# Patient Record
Sex: MALE | Race: WHITE | Employment: FULL TIME | URBAN - METROPOLITAN AREA
[De-identification: names, ages, dates, MRNs, and addresses within clinical notes are randomized per-mention and may not be internally consistent; named-entity substitution may affect disease eponyms.]

---

## 2017-04-13 ENCOUNTER — OFFICE VISIT (OUTPATIENT)
Dept: FAMILY MEDICINE | Facility: CLINIC | Age: 57
End: 2017-04-13
Payer: COMMERCIAL

## 2017-04-13 VITALS
OXYGEN SATURATION: 98 % | DIASTOLIC BLOOD PRESSURE: 89 MMHG | BODY MASS INDEX: 28.86 KG/M2 | SYSTOLIC BLOOD PRESSURE: 136 MMHG | WEIGHT: 217.8 LBS | HEIGHT: 73 IN | TEMPERATURE: 97.2 F | HEART RATE: 70 BPM

## 2017-04-13 DIAGNOSIS — R10.32 ABDOMINAL PAIN, LEFT LOWER QUADRANT: Primary | ICD-10-CM

## 2017-04-13 PROCEDURE — 99213 OFFICE O/P EST LOW 20 MIN: CPT | Performed by: FAMILY MEDICINE

## 2017-04-13 NOTE — NURSING NOTE
"Chief Complaint   Patient presents with     Abdominal Pain       Initial BP (!) 148/94  Pulse 70  Temp 97.2  F (36.2  C) (Tympanic)  Ht 6' 1\" (1.854 m)  Wt 217 lb 12.8 oz (98.8 kg)  SpO2 98%  BMI 28.74 kg/m2 Estimated body mass index is 28.74 kg/(m^2) as calculated from the following:    Height as of this encounter: 6' 1\" (1.854 m).    Weight as of this encounter: 217 lb 12.8 oz (98.8 kg).  Medication Reconciliation: complete     Sandee Vergara MA      "

## 2017-04-13 NOTE — PROGRESS NOTES
SUBJECTIVE:                                                    Rex Jensen is a 56 year old male who presents to clinic today for the following health issues:      ABDOMINAL PAIN     Onset: x3-4 days     Description:   Character: Burning, aching, pain with coughing   Location: Lower left abdomin/ pelvic region   Radiation: None    Intensity: 3/10    Progression of Symptoms:  worsening    Accompanying Signs & Symptoms:  Fever/Chills?: no   Gas/Bloating: YES  Nausea: no   Vomitting: no   Diarrhea?: no   Constipation: YES   Dysuria or Hematuria: no    History:   Trauma: no, pt thinks it may be a hernia.  Noticing a something protruding in lower left abdomen   Previous similar pain: no    Previous tests done: none    Precipitating factors:   Does the pain change with:     Food: YES- pain is worse after eating      BM: YES- relief after BM     Urination: no     Alleviating factors:  none    Therapies Tried and outcome: none    LMP:  not applicable       Denies having any fever or chills.  States that he discontinued taking his Protonix due to fear of potential long term effects.   Last BM was this morning, small one.     Problem list and histories reviewed & adjusted, as indicated.  Additional history: as documented    Current Outpatient Prescriptions   Medication Sig Dispense Refill     albuterol (PROAIR HFA/PROVENTIL HFA/VENTOLIN HFA) 108 (90 BASE) MCG/ACT Inhaler Inhale 2 puffs into the lungs every 6 hours as needed for shortness of breath / dyspnea or wheezing 1 Inhaler 0     cetirizine (ZYRTEC) 10 MG tablet Take 1 tablet (10 mg) by mouth every evening 90 tablet 3     pantoprazole (PROTONIX) 40 MG enteric coated tablet Take 1 tablet (40 mg) by mouth daily 90 tablet 3     fexofenadine (ALLEGRA) 180 MG tablet Take 1 tablet (180 mg) by mouth daily 30 tablet 2     ASPIRIN 325 MG OR TBEC ONE DAILY 100 3     amitriptyline (ELAVIL) 10 MG tablet Take 1-2 tablets by mouth at bedtime as needed. (Patient not taking:  "Reported on 4/13/2017) 60 tablet 2     indomethacin (INDOCIN) 25 MG capsule Take 1 capsule by mouth at the onset of a headache. (Patient not taking: Reported on 4/13/2017) 30 capsule 1     Allergies   Allergen Reactions     No Known Allergies        Reviewed and updated as needed this visit by clinical staff  Tobacco  Allergies  Meds       Reviewed and updated as needed this visit by Provider         ROS:  Constitutional, HEENT, cardiovascular, pulmonary, gi and gu systems are negative, except as otherwise noted.    OBJECTIVE:                                                    BP (!) 148/94  Pulse 70  Temp 97.2  F (36.2  C) (Tympanic)  Ht 6' 1\" (1.854 m)  Wt 217 lb 12.8 oz (98.8 kg)  SpO2 98%  BMI 28.74 kg/m2  Body mass index is 28.74 kg/(m^2).  GENERAL: healthy, alert and no distress  RESP: lungs clear to auscultation - no rales, rhonchi or wheezes  CV: regular rate and rhythm, normal S1 S2, no S3 or S4, no murmur, click or rub, no peripheral edema and peripheral pulses strong  ABDOMEN: soft, mild LLQ tenderness, no obvious abdominal hernia. Np hepatosplenomegaly, no masses and bowel sounds normal    Diagnostic Test Results:  See orders below     ASSESSMENT/PLAN:                                                    Rex was seen today for abdominal pain.    Diagnoses and all orders for this visit:    Abdominal pain, left lower quadrant  -     CT Abdomen Pelvis w Contrast; Future  -     H Pylori antigen stool; Future          -    Resume taking PPI, Protonix for 2-4 weeks.         -     May take OTC stool softener as needed for constipation.    Will notify patient with results    If symptoms worsened, patient instructed to go to the nearest ER for further evaluation. Patient verbalized understanding.        Sonia Bentley MD  [facility]    "

## 2017-04-13 NOTE — MR AVS SNAPSHOT
After Visit Summary   4/13/2017    Rex Jensen    MRN: [id number]           Patient Information     Date Of Birth          1960        Visit Information        Provider Department      4/13/2017 10:00 AM Sonia Bentley MD [facility]        Today's Diagnoses     Abdominal pain, left lower quadrant    -  1      Care Instructions    Resume taking Protonix.  Will notify you with results        Follow-ups after your visit        Follow-up notes from your care team     Return if symptoms worsen or fail to improve.      Future tests that were ordered for you today     Open Future Orders        Priority Expected Expires Ordered    CT Abdomen Pelvis w Contrast Routine  4/13/2018 4/13/2017    H Pylori antigen stool Routine  5/13/2017 4/13/2017            Who to contact     Normal or non-critical lab and imaging results will be communicated to you by StackBlazehart, letter or phone within 4 business days after the clinic has received the results. If you do not hear from us within 7 days, please contact the clinic through [employer] or phone. If you have a critical or abnormal lab result, we will notify you by phone as soon as possible.  Submit refill requests through [employer] or call your pharmacy and they will forward the refill request to us. Please allow 3 business days for your refill to be completed.          If you need to speak with a  for additional information , please call: [phone]             Additional Information About Your Visit        StackBlazeharNetIQ Information     [employer] gives you secure access to your electronic health record. If you see a primary care provider, you can also send messages to your care team and make appointments. If you have questions, please call your primary care clinic.  If you do not have a primary care provider, please call [phone] and they will assist you.        Care EveryWhere ID     This is your Care EveryWhere ID. This could be used by  "other organizations to access your [place] medical records  [id number]        Your Vitals Were     Pulse Temperature Height Pulse Oximetry BMI (Body Mass Index)       70 97.2  F (36.2  C) (Tympanic) 6' 1\" (1.854 m) 98% 28.74 kg/m2        Blood Pressure from Last 3 Encounters:   04/13/17 (!) 148/94   12/01/16 125/84   11/21/16 126/90    Weight from Last 3 Encounters:   04/13/17 217 lb 12.8 oz (98.8 kg)   12/01/16 223 lb 6.4 oz (101.3 kg)   11/21/16 219 lb (99.3 kg)               Primary Care Provider Office Phone #    Marion Mullins [facility] [phone]       No address on file        Thank you!     Thank you for choosing [facility]  for your care. Our goal is always to provide you with excellent care. Hearing back from our patients is one way we can continue to improve our services. Please take a few minutes to complete the written survey that you may receive in the mail after your visit with us. Thank you!             Your Updated Medication List - Protect others around you: Learn how to safely use, store and throw away your medicines at www.disposemymeds.org.          This list is accurate as of: 4/13/17 10:33 AM.  Always use your most recent med list.                   Brand Name Dispense Instructions for use    albuterol 108 (90 BASE) MCG/ACT Inhaler    PROAIR HFA/PROVENTIL HFA/VENTOLIN HFA    1 Inhaler    Inhale 2 puffs into the lungs every 6 hours as needed for shortness of breath / dyspnea or wheezing       amitriptyline 10 MG tablet    ELAVIL    60 tablet    Take 1-2 tablets by mouth at bedtime as needed.       aspirin 325 MG EC tablet     100    ONE DAILY       cetirizine 10 MG tablet    zyrTEC    90 tablet    Take 1 tablet (10 mg) by mouth every evening       fexofenadine 180 MG tablet    ALLEGRA    30 tablet    Take 1 tablet (180 mg) by mouth daily       indomethacin 25 MG capsule    INDOCIN    30 capsule    Take 1 capsule by mouth at the onset of a headache.       pantoprazole 40 MG EC " tablet    PROTONIX    90 tablet    Take 1 tablet (40 mg) by mouth daily

## 2017-04-14 ENCOUNTER — RADIANT APPOINTMENT (OUTPATIENT)
Dept: CT IMAGING | Facility: CLINIC | Age: 57
End: 2017-04-14
Attending: FAMILY MEDICINE
Payer: COMMERCIAL

## 2017-04-14 DIAGNOSIS — R10.32 ABDOMINAL PAIN, LEFT LOWER QUADRANT: ICD-10-CM

## 2017-04-14 LAB — RADIOLOGIST FLAGS: ABNORMAL

## 2017-04-14 PROCEDURE — 74177 CT ABD & PELVIS W/CONTRAST: CPT | Mod: TC

## 2017-04-14 RX ORDER — IOPAMIDOL 755 MG/ML
100 INJECTION, SOLUTION INTRAVASCULAR ONCE
Status: COMPLETED | OUTPATIENT
Start: 2017-04-14 | End: 2017-04-14

## 2017-04-14 RX ADMIN — IOPAMIDOL 100 ML: 755 INJECTION, SOLUTION INTRAVASCULAR at 13:26

## 2017-04-15 ENCOUNTER — TELEPHONE (OUTPATIENT)
Dept: FAMILY MEDICINE | Facility: CLINIC | Age: 57
End: 2017-04-15

## 2017-04-15 NOTE — PROGRESS NOTES
Received request for CT results.  Chart reviewed and provider attempted to call.  Talked with patient and relayed results of 11 cm mass.    Recommended called to see surgery next week. Go to ED if worsening pain. Patient stated he would go to [facility] if needed.    Brittnee Bagley MD

## 2017-04-17 ENCOUNTER — OFFICE VISIT (OUTPATIENT)
Dept: FAMILY MEDICINE | Facility: CLINIC | Age: 57
End: 2017-04-17
Payer: COMMERCIAL

## 2017-04-17 ENCOUNTER — TELEPHONE (OUTPATIENT)
Dept: FAMILY MEDICINE | Facility: CLINIC | Age: 57
End: 2017-04-17

## 2017-04-17 VITALS
WEIGHT: 217 LBS | BODY MASS INDEX: 28.76 KG/M2 | HEIGHT: 73 IN | DIASTOLIC BLOOD PRESSURE: 85 MMHG | SYSTOLIC BLOOD PRESSURE: 122 MMHG | HEART RATE: 68 BPM | RESPIRATION RATE: 18 BRPM | OXYGEN SATURATION: 98 %

## 2017-04-17 DIAGNOSIS — R10.32 ABDOMINAL PAIN, LEFT LOWER QUADRANT: ICD-10-CM

## 2017-04-17 DIAGNOSIS — K66.8 MASS OF PERITONEUM: Primary | ICD-10-CM

## 2017-04-17 DIAGNOSIS — R19.00 RETROPERITONEAL MASS: Primary | ICD-10-CM

## 2017-04-17 DIAGNOSIS — E55.9 VITAMIN D DEFICIENCY: ICD-10-CM

## 2017-04-17 PROCEDURE — 99214 OFFICE O/P EST MOD 30 MIN: CPT | Performed by: PHYSICIAN ASSISTANT

## 2017-04-17 PROCEDURE — 82306 VITAMIN D 25 HYDROXY: CPT | Performed by: PHYSICIAN ASSISTANT

## 2017-04-17 PROCEDURE — 36415 COLL VENOUS BLD VENIPUNCTURE: CPT | Performed by: PHYSICIAN ASSISTANT

## 2017-04-17 RX ORDER — HYDROCODONE BITARTRATE AND ACETAMINOPHEN 5; 325 MG/1; MG/1
1-2 TABLET ORAL EVERY 6 HOURS PRN
Qty: 20 TABLET | Refills: 0 | Status: SHIPPED | OUTPATIENT
Start: 2017-04-17 | End: 2017-04-21

## 2017-04-17 RX ORDER — AMOXICILLIN 250 MG
1 CAPSULE ORAL DAILY
Qty: 100 TABLET | Refills: 0 | Status: SHIPPED | OUTPATIENT
Start: 2017-04-17 | End: 2017-05-05

## 2017-04-17 NOTE — TELEPHONE ENCOUNTER
Patient states he has a mass and would like to know where he should go from here.  Please call.    Thank you.

## 2017-04-17 NOTE — PROGRESS NOTES
SUBJECTIVE:                                                    Rex Jensen is a 56 year old male who presents to clinic today for the following health issues:      Mass-discuss CT results. Second opinion.  PET scan ordered. Oncology surgery referral placed.     Rare leg leg and periscrotal paresthesias.  Occasional constipation and postprandial pain. No n/v, noc sweats or weight changes.      Problem list and histories reviewed & adjusted, as indicated.  Additional history: as documented        Reviewed and updated as needed this visit by clinical staff  Tobacco  Allergies  Meds  Med Hx  Surg Hx  Fam Hx  Soc Hx      Reviewed and updated as needed this visit by Provider         All other systems negative except as outline above  OBJECTIVE:  Eye exam - right eye normal lid, conjunctiva, cornea, pupil and fundus, left eye normal lid, conjunctiva, cornea, pupil and fundus.  Thyroid not palpable, not enlarged, no nodules detected.  CHEST:chest clear to IPPA, no tachypnea, retractions or cyanosis and S1, S2 normal, no murmur, no gallop, rate regular.  The abdomen is soft without tenderness, guarding, mass, rebound or organomegaly. Bowel sounds are normal. No CVA tenderness or inguinal adenopathy noted.  No edema  Rex was seen today for mass.    Diagnoses and all orders for this visit:    Retroperitoneal mass  -     Surgical Oncology Adult IP Consult    Vitamin D deficiency  -     Vitamin D Deficiency      Pet scan pending.

## 2017-04-17 NOTE — TELEPHONE ENCOUNTER
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 Follow-up with surgical oncology as needed for further  assessment. PET/CT exam as needed for staging.

## 2017-04-17 NOTE — TELEPHONE ENCOUNTER
Patient states he would like to be fit in to see you today about a 2nd opinion on a mass that a scan showed.  Please call.    Thank you.

## 2017-04-17 NOTE — MR AVS SNAPSHOT
After Visit Summary   4/17/2017    eRx Jensen    MRN: [id number]           Patient Information     Date Of Birth          1960        Visit Information        Provider Department      4/17/2017 1:40 PM Js Magdaleno PA-C [facility]        Today's Diagnoses     Retroperitoneal mass    -  1    Vitamin D deficiency           Follow-ups after your visit        Additional Services     Surgical Oncology Adult IP Consult                 Future tests that were ordered for you today     Open Future Orders        Priority Expected Expires Ordered    PET Oncology Whole Body Routine  4/17/2018 4/17/2017            Who to contact     Normal or non-critical lab and imaging results will be communicated to you by InitMehart, letter or phone within 4 business days after the clinic has received the results. If you do not hear from us within 7 days, please contact the clinic through [employer] or phone. If you have a critical or abnormal lab result, we will notify you by phone as soon as possible.  Submit refill requests through [employer] or call your pharmacy and they will forward the refill request to us. Please allow 3 business days for your refill to be completed.          If you need to speak with a  for additional information , please call: [phone]             Additional Information About Your Visit        InitMeharRocketBux Information     [employer] gives you secure access to your electronic health record. If you see a primary care provider, you can also send messages to your care team and make appointments. If you have questions, please call your primary care clinic.  If you do not have a primary care provider, please call [phone] and they will assist you.        Care EveryWhere ID     This is your Care EveryWhere ID. This could be used by other organizations to access your [place] medical records  [id number]        Your Vitals Were     Pulse Respirations Height Pulse Oximetry  "BMI (Body Mass Index)       68 18 6' 1\" (1.854 m) 98% 28.63 kg/m2        Blood Pressure from Last 3 Encounters:   04/17/17 122/85   04/13/17 136/89   12/01/16 125/84    Weight from Last 3 Encounters:   04/17/17 217 lb (98.4 kg)   04/13/17 217 lb 12.8 oz (98.8 kg)   12/01/16 223 lb 6.4 oz (101.3 kg)              We Performed the Following     Surgical Oncology Adult IP Consult     Vitamin D Deficiency          Today's Medication Changes          These changes are accurate as of: 4/17/17  2:00 PM.  If you have any questions, ask your nurse or doctor.               Start taking these medicines.        Dose/Directions    HYDROcodone-acetaminophen 5-325 MG per tablet   Commonly known as:  NORCO   Used for:  Abdominal pain, left lower quadrant   Started by:  Sonia Bentley MD        Dose:  1-2 tablet   Take 1-2 tablets by mouth every 6 hours as needed for moderate to severe pain maximum 2 tablet(s) per day   Quantity:  20 tablet   Refills:  0       senna-docusate 8.6-50 MG per tablet   Commonly known as:  SENOKOT-S;PERICOLACE   Used for:  Abdominal pain, left lower quadrant   Started by:  Sonia Bentley MD        Dose:  1 tablet   Take 1 tablet by mouth daily   Quantity:  100 tablet   Refills:  0         Stop taking these medicines if you haven't already. Please contact your care team if you have questions.     albuterol 108 (90 BASE) MCG/ACT Inhaler   Commonly known as:  PROAIR HFA/PROVENTIL HFA/VENTOLIN HFA   Stopped by:  Js Magdaleno PA-C           indomethacin 25 MG capsule   Commonly known as:  INDOCIN   Stopped by:  Js Magdaleno PA-C                Where to get your medicines      These medications were sent to [facility] [id number]  [place] [address] [address] [address] [address] [address] [address]     Phone:  [phone]     senna-docusate 8.6-50 MG per tablet         Some of these will need a paper prescription and others can be bought over the " counter.  Ask your nurse if you have questions.     Bring a paper prescription for each of these medications     HYDROcodone-acetaminophen 5-325 MG per tablet                Primary Care Provider Office Phone #    Lior Frank [phone]       No address on file        Thank you!     Thank you for choosing [facility]  for your care. Our goal is always to provide you with excellent care. Hearing back from our patients is one way we can continue to improve our services. Please take a few minutes to complete the written survey that you may receive in the mail after your visit with us. Thank you!             Your Updated Medication List - Protect others around you: Learn how to safely use, store and throw away your medicines at www.disposemymeds.org.          This list is accurate as of: 4/17/17  2:00 PM.  Always use your most recent med list.                   Brand Name Dispense Instructions for use    amitriptyline 10 MG tablet    ELAVIL    60 tablet    Take 1-2 tablets by mouth at bedtime as needed.       aspirin 325 MG EC tablet     100    ONE DAILY       cetirizine 10 MG tablet    zyrTEC    90 tablet    Take 1 tablet (10 mg) by mouth every evening       fexofenadine 180 MG tablet    ALLEGRA    30 tablet    Take 1 tablet (180 mg) by mouth daily       HYDROcodone-acetaminophen 5-325 MG per tablet    NORCO    20 tablet    Take 1-2 tablets by mouth every 6 hours as needed for moderate to severe pain maximum 2 tablet(s) per day       pantoprazole 40 MG EC tablet    PROTONIX    90 tablet    Take 1 tablet (40 mg) by mouth daily       senna-docusate 8.6-50 MG per tablet    SENOKOT-S;PERICOLACE    100 tablet    Take 1 tablet by mouth daily

## 2017-04-18 LAB — DEPRECATED CALCIDIOL+CALCIFEROL SERPL-MC: 33 UG/L (ref 20–75)

## 2017-04-19 ENCOUNTER — HOSPITAL ENCOUNTER (OUTPATIENT)
Dept: PET IMAGING | Facility: CLINIC | Age: 57
Discharge: HOME | End: 2017-04-19
Attending: FAMILY MEDICINE | Admitting: FAMILY MEDICINE
Payer: COMMERCIAL

## 2017-04-19 DIAGNOSIS — K66.8 MASS OF PERITONEUM: ICD-10-CM

## 2017-04-19 LAB — GLUCOSE BLDC GLUCOMTR-MCNC: 91 MG/DL (ref 70–99)

## 2017-04-19 PROCEDURE — 71260 CT THORAX DX C+: CPT

## 2017-04-19 PROCEDURE — 78816 PET IMAGE W/CT FULL BODY: CPT | Mod: PI

## 2017-04-19 PROCEDURE — 34300033 ZZH RX 343: Performed by: FAMILY MEDICINE

## 2017-04-19 PROCEDURE — 82962 GLUCOSE BLOOD TEST: CPT

## 2017-04-19 PROCEDURE — 25500064 ZZH RX 255 OP 636: Performed by: FAMILY MEDICINE

## 2017-04-19 PROCEDURE — A9552 F18 FDG: HCPCS | Performed by: FAMILY MEDICINE

## 2017-04-19 RX ORDER — IOPAMIDOL 755 MG/ML
117 INJECTION, SOLUTION INTRAVASCULAR ONCE
Status: COMPLETED | OUTPATIENT
Start: 2017-04-19 | End: 2017-04-19

## 2017-04-19 RX ADMIN — FLUDEOXYGLUCOSE F-18 13.8 MCI.: 500 INJECTION, SOLUTION INTRAVENOUS at 12:30

## 2017-04-19 RX ADMIN — IOPAMIDOL 117 ML: 755 INJECTION, SOLUTION INTRAVENOUS at 12:53

## 2017-04-20 ENCOUNTER — OFFICE VISIT (OUTPATIENT)
Dept: FAMILY MEDICINE | Facility: CLINIC | Age: 57
End: 2017-04-20
Payer: COMMERCIAL

## 2017-04-20 ENCOUNTER — OFFICE VISIT (OUTPATIENT)
Dept: SURGERY | Facility: CLINIC | Age: 57
End: 2017-04-20
Attending: FAMILY MEDICINE
Payer: COMMERCIAL

## 2017-04-20 ENCOUNTER — TELEPHONE (OUTPATIENT)
Dept: FAMILY MEDICINE | Facility: CLINIC | Age: 57
End: 2017-04-20

## 2017-04-20 VITALS
HEART RATE: 73 BPM | SYSTOLIC BLOOD PRESSURE: 142 MMHG | DIASTOLIC BLOOD PRESSURE: 88 MMHG | WEIGHT: 218.1 LBS | BODY MASS INDEX: 28.77 KG/M2

## 2017-04-20 VITALS
DIASTOLIC BLOOD PRESSURE: 82 MMHG | BODY MASS INDEX: 28.23 KG/M2 | SYSTOLIC BLOOD PRESSURE: 120 MMHG | HEART RATE: 76 BPM | TEMPERATURE: 99 F | OXYGEN SATURATION: 97 % | WEIGHT: 214 LBS

## 2017-04-20 DIAGNOSIS — R19.00 RETROPERITONEAL MASS: Primary | ICD-10-CM

## 2017-04-20 DIAGNOSIS — R19.00 RETROPERITONEAL MASS: ICD-10-CM

## 2017-04-20 DIAGNOSIS — Z01.818 PREOP GENERAL PHYSICAL EXAM: Primary | ICD-10-CM

## 2017-04-20 LAB
ANION GAP SERPL CALCULATED.3IONS-SCNC: 6 MMOL/L (ref 3–14)
BUN SERPL-MCNC: 16 MG/DL (ref 7–30)
CALCIUM SERPL-MCNC: 9.7 MG/DL (ref 8.5–10.1)
CHLORIDE SERPL-SCNC: 104 MMOL/L (ref 94–109)
CO2 SERPL-SCNC: 29 MMOL/L (ref 20–32)
CREAT SERPL-MCNC: 1.13 MG/DL (ref 0.66–1.25)
ERYTHROCYTE [DISTWIDTH] IN BLOOD BY AUTOMATED COUNT: 13.4 % (ref 10–15)
GFR SERPL CREATININE-BSD FRML MDRD: 67 ML/MIN/1.7M2
GLUCOSE SERPL-MCNC: 92 MG/DL (ref 70–99)
HCT VFR BLD AUTO: 43.7 % (ref 40–53)
HGB BLD-MCNC: 14.2 G/DL (ref 13.3–17.7)
INR PPP: 1.03 (ref 0.86–1.14)
MCH RBC QN AUTO: 26.2 PG (ref 26.5–33)
MCHC RBC AUTO-ENTMCNC: 32.5 G/DL (ref 31.5–36.5)
MCV RBC AUTO: 81 FL (ref 78–100)
PLATELET # BLD AUTO: 249 10E9/L (ref 150–450)
POTASSIUM SERPL-SCNC: 4.7 MMOL/L (ref 3.4–5.3)
RBC # BLD AUTO: 5.43 10E12/L (ref 4.4–5.9)
SODIUM SERPL-SCNC: 139 MMOL/L (ref 133–144)
WBC # BLD AUTO: 7.8 10E9/L (ref 4–11)

## 2017-04-20 PROCEDURE — 80048 BASIC METABOLIC PNL TOTAL CA: CPT | Performed by: SURGERY

## 2017-04-20 PROCEDURE — 99214 OFFICE O/P EST MOD 30 MIN: CPT | Performed by: PHYSICIAN ASSISTANT

## 2017-04-20 PROCEDURE — 85610 PROTHROMBIN TIME: CPT | Performed by: SURGERY

## 2017-04-20 PROCEDURE — 99243 OFF/OP CNSLTJ NEW/EST LOW 30: CPT | Performed by: SURGERY

## 2017-04-20 PROCEDURE — 36415 COLL VENOUS BLD VENIPUNCTURE: CPT | Performed by: SURGERY

## 2017-04-20 PROCEDURE — 85027 COMPLETE CBC AUTOMATED: CPT | Performed by: SURGERY

## 2017-04-20 ASSESSMENT — PAIN SCALES - GENERAL: PAINLEVEL: MILD PAIN (2)

## 2017-04-20 NOTE — TELEPHONE ENCOUNTER
URSULA Weinstein - patient has met with surgeon this morning; mass likely malignant. Having pre op at [place] today for biopsy on Monday 4/24/17. Will be meeting with surgeon to discuss bx results.

## 2017-04-20 NOTE — NURSING NOTE
"Rex Jensen's goals for this visit include:   Chief Complaint   Patient presents with     Consult     peritoneium mass     He requests these members of his care team be copied on today's visit information: YES -     Referring Provider:  [facility]  [address] [place] [address]    Initial /88 (BP Location: Left arm, Patient Position: Chair, Cuff Size: Adult Large)  Pulse 73  Wt 98.9 kg (218 lb 1.6 oz)  BMI 28.77 kg/m2 Estimated body mass index is 28.77 kg/(m^2) as calculated from the following:    Height as of 4/17/17: 1.854 m (6' 1\").    Weight as of this encounter: 98.9 kg (218 lb 1.6 oz).  BP completed using cuff size: large      "

## 2017-04-20 NOTE — TELEPHONE ENCOUNTER
PET scan as ordered by , not yet commented on by provider. Sent to [facility] provider pool in absence of .

## 2017-04-20 NOTE — NURSING NOTE
"Chief Complaint   Patient presents with     Pre-Op Exam       Initial BP (!) 140/95 (Cuff Size: Adult Large)  Pulse 76  Temp 99  F (37.2  C) (Oral)  Wt 214 lb (97.1 kg)  SpO2 97%  BMI 28.23 kg/m2 Estimated body mass index is 28.23 kg/(m^2) as calculated from the following:    Height as of 4/17/17: 6' 1\" (1.854 m).    Weight as of this encounter: 214 lb (97.1 kg).  Medication Reconciliation: complete    UMA Villeda MA    "

## 2017-04-20 NOTE — Clinical Note
Please sign and send to [facility] for fax. Please close. Kristen Kehr PA-C

## 2017-04-20 NOTE — MR AVS SNAPSHOT
After Visit Summary   4/20/2017    Rex Jensen    MRN: [id number]           Patient Information     Date Of Birth          1960        Visit Information        Provider Department      4/20/2017 11:00 AM Linn Hull MD [facility]        Today's Diagnoses     Retroperitoneal mass    -  1       Follow-ups after your visit        Your next 10 appointments already scheduled     Apr 24, 2017  1:00 PM CDT   CT ABDOMEN RETROPERITONEAL BIOPSY with SHCT1, SH BODY RAD   [facility] CT ([facility])    [address] [facility] [id number] [phone]           Plan for an adult to drive you home and stay with you until morning.  Tell your doctor in advance:   If you have any allergies.   If you are breastfeeding or there s any chance you are pregnant.  Please bring any scans or X-rays taken at other hospitals, if they may be helpful. Also bring a list of your medicines, including vitamins, minerals and over-the-counter drugs. It is safest to leave valuables at home.  If you take blood thinners, you may need to stop taking them a few days before treatment. Talk to your doctor before stopping these medicines. You will need a blood test the morning of your exam.   Stop taking Coumadin (warfarin) 5 days before treatment. Restart the day after treatment.   If you take aspirin, you may need to stop taking it 3 days before treatment.   If you take Plavix, Ticlid, Pletal or Persantine, you may need to stop taking them 5 days before your scan.  If you have diabetes:   If you take insulin, call your diabetes care team. Ask if you should adjust your insulin before this test.   If your kidney function is normal, continue taking your metformin (Avandamet, Glucophage, Glucovance, Metaglip) on the day of your exam.   If your kidney function is abnormal, wait 48 hours before restarting this medicine.  If you have any questions, please call the imaging  department where you will have your exam.  The day before your exam: Drink extra fluids at least six 8-ounce glasses (unless your doctor tells you to restrict fluids). The day of your exam: No eating or drinking for 4 hours before your test. You may take medicine with small sips of water.              Future tests that were ordered for you today     Open Future Orders        Priority Expected Expires Ordered    INR Routine  4/20/2018 4/20/2017    CBC with platelets Routine  4/20/2018 4/20/2017    CT Abdomen Retroperitoneal Biopsy Routine  4/20/2018 4/20/2017            Who to contact     If you have questions or need follow up information about today's clinic visit or your schedule please contact [facility] directly at [phone].  Normal or non-critical lab and imaging results will be communicated to you by Jamdat Mobilehart, letter or phone within 4 business days after the clinic has received the results. If you do not hear from us within 7 days, please contact the clinic through Jamdat Mobilehart or phone. If you have a critical or abnormal lab result, we will notify you by phone as soon as possible.  Submit refill requests through VMob or call your pharmacy and they will forward the refill request to us. Please allow 3 business days for your refill to be completed.          Additional Information About Your Visit        Jamdat Mobilehart Information     VMob gives you secure access to your electronic health record. If you see a primary care provider, you can also send messages to your care team and make appointments. If you have questions, please call your primary care clinic.  If you do not have a primary care provider, please call [phone] and they will assist you.      VMob is an electronic gateway that provides easy, online access to your medical records. With VMob, you can request a clinic appointment, read your test results, renew a prescription or communicate with your care team.     To access your  existing account, please contact your [facility] or call [phone] for assistance.        Care EveryWhere ID     This is your Care EveryWhere ID. This could be used by other organizations to access your [place] medical records  [id number]        Your Vitals Were     Pulse BMI (Body Mass Index)                73 28.77 kg/m2           Blood Pressure from Last 3 Encounters:   04/20/17 142/88   04/17/17 122/85   04/13/17 136/89    Weight from Last 3 Encounters:   04/20/17 98.9 kg (218 lb 1.6 oz)   04/17/17 98.4 kg (217 lb)   04/13/17 98.8 kg (217 lb 12.8 oz)              We Performed the Following     Basic metabolic panel        Primary Care Provider Office Phone # Fax #    Js Magdaleno PA-C [phone]       [facility] [place] [address] [facility] [address]        Thank you!     Thank you for choosing [facility]  for your care. Our goal is always to provide you with excellent care. Hearing back from our patients is one way we can continue to improve our services. Please take a few minutes to complete the written survey that you may receive in the mail after your visit with us. Thank you!             Your Updated Medication List - Protect others around you: Learn how to safely use, store and throw away your medicines at www.disposemymeds.org.          This list is accurate as of: 4/20/17 12:00 PM.  Always use your most recent med list.                   Brand Name Dispense Instructions for use    amitriptyline 10 MG tablet    ELAVIL    60 tablet    Take 1-2 tablets by mouth at bedtime as needed.       aspirin 325 MG EC tablet     100    ONE DAILY       cetirizine 10 MG tablet    zyrTEC    90 tablet    Take 1 tablet (10 mg) by mouth every evening       fexofenadine 180 MG tablet    ALLEGRA    30 tablet    Take 1 tablet (180 mg) by mouth daily       HYDROcodone-acetaminophen 5-325 MG per tablet    NORCO    20 tablet    Take 1-2 tablets by  mouth every 6 hours as needed for moderate to severe pain maximum 2 tablet(s) per day       pantoprazole 40 MG EC tablet    PROTONIX    90 tablet    Take 1 tablet (40 mg) by mouth daily       senna-docusate 8.6-50 MG per tablet    SENOKOT-S;PERICOLACE    100 tablet    Take 1 tablet by mouth daily
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
## 2017-04-20 NOTE — MR AVS SNAPSHOT
After Visit Summary   4/20/2017    Rex Jensen    MRN: [id number]           Patient Information     Date Of Birth          1960        Visit Information        Provider Department      4/20/2017 2:10 PM Kehr, Kristen M, PA-C [facility]        Today's Diagnoses     Preop general physical exam    -  1    Retroperitoneal mass          Care Instructions      Before Your Surgery      Call your surgeon if there is any change in your health. This includes signs of a cold or flu (such as a sore throat, runny nose, cough, rash or fever).    Do not smoke, drink alcohol or take over the counter medicine (unless your surgeon or primary care doctor tells you to) for the 24 hours before and after surgery.    If you take prescribed drugs: Follow your doctor s orders about which medicines to take and which to stop until after surgery.    Eating and drinking prior to surgery: follow the instructions from your surgeon    Take a shower or bath the night before surgery. Use the soap your surgeon gave you to gently clean your skin. If you do not have soap from your surgeon, use your regular soap. Do not shave or scrub the surgery site.  Wear clean pajamas and have clean sheets on your bed.         Follow-ups after your visit        Your next 10 appointments already scheduled     Apr 24, 2017  1:00 PM CDT   CT ABDOMEN RETROPERITONEAL BIOPSY with SHCT1, SH BODY RAD   [facility] CT ([facility])    [address] [place] [id number] [phone]           Plan for an adult to drive you home and stay with you until morning.  Tell your doctor in advance:   If you have any allergies.   If you are breastfeeding or there s any chance you are pregnant.  Please bring any scans or X-rays taken at other hospitals, if they may be helpful. Also bring a list of your medicines, including vitamins, minerals and over-the-counter drugs. It is safest to leave valuables at home.  If  you take blood thinners, you may need to stop taking them a few days before treatment. Talk to your doctor before stopping these medicines. You will need a blood test the morning of your exam.   Stop taking Coumadin (warfarin) 5 days before treatment. Restart the day after treatment.   If you take aspirin, you may need to stop taking it 3 days before treatment.   If you take Plavix, Ticlid, Pletal or Persantine, you may need to stop taking them 5 days before your scan.  If you have diabetes:   If you take insulin, call your diabetes care team. Ask if you should adjust your insulin before this test.   If your kidney function is normal, continue taking your metformin (Avandamet, Glucophage, Glucovance, Metaglip) on the day of your exam.   If your kidney function is abnormal, wait 48 hours before restarting this medicine.  If you have any questions, please call the imaging department where you will have your exam.  The day before your exam: Drink extra fluids at least six 8-ounce glasses (unless your doctor tells you to restrict fluids). The day of your exam: No eating or drinking for 4 hours before your test. You may take medicine with small sips of water.              Who to contact     If you have questions or need follow up information about today's clinic visit or your schedule please contact [facility] directly at [phone].  Normal or non-critical lab and imaging results will be communicated to you by MyChart, letter or phone within 4 business days after the clinic has received the results. If you do not hear from us within 7 days, please contact the clinic through MyChart or phone. If you have a critical or abnormal lab result, we will notify you by phone as soon as possible.  Submit refill requests through Goji or call your pharmacy and they will forward the refill request to us. Please allow 3 business days for your refill to be completed.          Additional Information About Your Visit         CrowdSling Information     CrowdSling gives you secure access to your electronic health record. If you see a primary care provider, you can also send messages to your care team and make appointments. If you have questions, please call your primary care clinic.  If you do not have a primary care provider, please call [phone] and they will assist you.        Care EveryWhere ID     This is your Care EveryWhere ID. This could be used by other organizations to access your [place] medical records  [id number]        Your Vitals Were     Pulse Temperature Pulse Oximetry BMI (Body Mass Index)          76 99  F (37.2  C) (Oral) 97% 28.23 kg/m2         Blood Pressure from Last 3 Encounters:   04/21/17 (!) 130/92   04/20/17 120/82   04/20/17 142/88    Weight from Last 3 Encounters:   04/21/17 213 lb 4.8 oz (96.8 kg)   04/20/17 214 lb (97.1 kg)   04/20/17 218 lb 1.6 oz (98.9 kg)              We Performed the Following     Basic metabolic panel  (Ca, Cl, CO2, Creat, Gluc, K, Na, BUN)     CBC with platelets     INR        Primary Care Provider Office Phone # Fax #    Js Magdaleno PA-C [phone]       [facility] [place] [address] [facility] [address]        Thank you!     Thank you for choosing [facility]  [facility]. Our goal is always to provide you with excellent care. Hearing back from our patients is one way we can continue to improve our services. Please take a few minutes to complete the written survey that you may receive in the mail after your visit with us. Thank you!             Your Updated Medication List - Protect others around you: Learn how to safely use, store and throw away your medicines at www.disposemymeds.org.          This list is accurate as of: 4/20/17 11:59 PM.  Always use your most recent med list.                   Brand Name Dispense Instructions for use    amitriptyline 10 MG tablet    ELAVIL    60 tablet    Take 1-2 tablets by mouth at bedtime as  needed.       aspirin 325 MG EC tablet     100    ONE DAILY       cetirizine 10 MG tablet    zyrTEC    90 tablet    Take 1 tablet (10 mg) by mouth every evening       fexofenadine 180 MG tablet    ALLEGRA    30 tablet    Take 1 tablet (180 mg) by mouth daily       HYDROcodone-acetaminophen 5-325 MG per tablet    NORCO    20 tablet    Take 1-2 tablets by mouth every 6 hours as needed for moderate to severe pain maximum 2 tablet(s) per day       pantoprazole 40 MG EC tablet    PROTONIX    90 tablet    Take 1 tablet (40 mg) by mouth daily       senna-docusate 8.6-50 MG per tablet    SENOKOT-S;PERICOLACE    100 tablet    Take 1 tablet by mouth daily

## 2017-04-21 ENCOUNTER — ONCOLOGY VISIT (OUTPATIENT)
Dept: ONCOLOGY | Facility: CLINIC | Age: 57
End: 2017-04-21
Attending: SURGERY
Payer: COMMERCIAL

## 2017-04-21 VITALS
OXYGEN SATURATION: 96 % | TEMPERATURE: 98.4 F | RESPIRATION RATE: 16 BRPM | WEIGHT: 213.3 LBS | HEART RATE: 85 BPM | SYSTOLIC BLOOD PRESSURE: 130 MMHG | HEIGHT: 73 IN | DIASTOLIC BLOOD PRESSURE: 92 MMHG | BODY MASS INDEX: 28.27 KG/M2

## 2017-04-21 DIAGNOSIS — R19.00 RETROPERITONEAL MASS: Primary | ICD-10-CM

## 2017-04-21 PROCEDURE — 99212 OFFICE O/P EST SF 10 MIN: CPT | Mod: ZF

## 2017-04-21 RX ORDER — HYDROCODONE BITARTRATE AND ACETAMINOPHEN 10; 325 MG/1; MG/1
1 TABLET ORAL EVERY 6 HOURS PRN
COMMUNITY
End: 2017-05-05

## 2017-04-21 ASSESSMENT — ENCOUNTER SYMPTOMS
POOR WOUND HEALING: 0
NAIL CHANGES: 0
SKIN CHANGES: 0

## 2017-04-21 ASSESSMENT — PAIN SCALES - GENERAL: PAINLEVEL: MILD PAIN (3)

## 2017-04-21 NOTE — PROGRESS NOTES
Rex Jensen is a 57-year-old man I was asked to see at the request of Dr. Linn Hull for evaluation of a left retroperitoneal mass. The patient has had increasing left-sided abdominal pain for several months.  He saw his physician.  A CT scan was obtained which demonstrated an 11.5 cm retroperitoneal mass involving the left iliopsoas muscles.  There did not appear to be any direct invasion to the kidney or bowel.  He does not have hydronephrosis on the left side.  He had a PET CT scan, which demonstrated hypermetabolic mass in the left retroperitoneum.  There is no disease in his chest.  He is now here to discuss treatment options.  His main symptom is left-sided abdominal pelvic pain.  He has had no leg swelling, no leg symptoms, no real back pain, no nausea or vomiting.  His weight has been stable.      PAST MEDICAL HISTORY:  No major medical problems other than knee surgery 20 years ago.      FAMILY HISTORY:  Negative for malignancy.      PHYSICAL EXAMINATION:  He is a well-appearing man in no apparent distress.  He is not jaundiced.  He has good nutritional status.  His abdomen is soft, nondistended, but he does have some firmness in his left lower quadrant.  I cannot appreciate the borders of the mass, but I can feel where it is.      IMPRESSION:  Retroperitoneal mass, likely malignant.      PLAN:  I told the patient and his wife that the differential diagnosis is retroperitoneal sarcoma, lymphoma or testicular neoplasm.  He is scheduled for a CT-guided biopsy next week.  If this comes back as a low intermediate grade sarcoma, I would recommend surgery first.  If this comes back as a high-grade sarcoma, I want him to see Radiation Oncology to evaluate the possibility of preoperative radiation therapy.  He and his wife understand this approach and wish to proceed.      TT:  30 minutes.  CT:  20 minutes      cc:   Linn Hull MD   [address] [facility] [facility] [address]   [place] [address]

## 2017-04-21 NOTE — NURSING NOTE
"Rex Jensen is a 57 year old male who presents for:  Chief Complaint   Patient presents with     Oncology Clinic Visit     Retroperitoneal mass.         Initial Vitals:  BP (!) 130/92 (BP Location: Right arm, Patient Position: Chair, Cuff Size: Adult Large)  Pulse 85  Temp 98.4  F (36.9  C) (Oral)  Resp 16  Ht 1.854 m (6' 0.99\")  Wt 96.8 kg (213 lb 4.8 oz)  SpO2 96%  BMI 28.15 kg/m2 Estimated body mass index is 28.15 kg/(m^2) as calculated from the following:    Height as of this encounter: 1.854 m (6' 0.99\").    Weight as of this encounter: 96.8 kg (213 lb 4.8 oz).. Body surface area is 2.23 meters squared. BP completed using cuff size: large  Mild Pain (3) No LMP for male patient. Allergies and medications reviewed.     Medications: Medication refills not needed today.  Pharm[facility] into [employer] [address] [place] [id number] [facility] [address]    Comments: Pt. Would like to talk about if having a biopsy is needed. Pt. Is experiencing more abdominal discomfort and is concerned tumor may be growing. Also would like to know the soonest he can have surgery.    10 minutes for nursing intake (face to face time)   Keely Whitt CMA        "

## 2017-04-23 NOTE — PROGRESS NOTES
FIRST-TIME CONSULT CLINIC NOTE      REASON FOR CONSULTATION:  I was asked by Dr. Bentley to see this patient for surgical options for a left retroperitoneal mass.      HISTORY OF PRESENT ILLNESS:  Rex Jensen is a 57-year-old gentleman who presented to his primary care physician in mid-April, complaining of some diffuse abdominal pain.  He states that the pain was a burning more prominent on the left abdomen than the right.  It did not affect his daily activities.  He had no nausea or vomiting with this.  He had denied any masses.  He has no history of hernias.  He had no change in urine stream or frequency in urination.  His bowel movements have been normal.  He did admit to some increased crampiness in the left lower quadrant about two hours after eating.  No fevers or chills associated with this.  The patient then underwent a CT scan that identified an 11 cm heterogeneous left retroperitoneal mass just lateral to the left psoas muscle.  With these findings, I further questioned the patient, and again no change in his frequency of urination, no blood identified in the urine.  He has no history of any carcinomas.  He has had no identified testicular masses.  No previous history of lymphoma.  He has no type B symptoms.  The patient has not had this mass biopsied.      PAST MEDICAL HISTORY, SURGICAL HISTORY, MEDICATIONS:  Have been reviewed and are available in the EMR.      REVIEW OF SYSTEMS:  Ten-point review of systems is pertinent for that noted in the HPI.      PHYSICAL EXAM:  With the patient in the supine position, his abdomen is soft, and there are no hernias identified.  There are no incisional scars.  There are no palpable masses present.  With deep palpation in the left inferior flank area, I can elicit more prominence or fullness; however, no true discernable mass.  He has no tenderness with left leg raise.      LABORATORY DATA:  He has not had any recent labs.      IMAGING:  CT scan identifies again a  greater than 11 cm heterogeneous mass adjacent to the psoas muscle.  No other masses are identified.      PET scan essentially only noted this mass to be hypermetabolic.  There were some mildly enlarged mesenteric lymph nodes with increased FDG; however, it was felt these may be reactive.      ASSESSMENT:  Left retroperitoneal mass.      PLAN:  Based on the characteristics and diagnosis, I am referring this patient to Dr. Alvin Bryant for surgery.  I will schedule the patient to have a CT-guided biopsy of this mass.  I defer to Dr. Bryant for if he feels that this may or may not need to be cancelled.  At the conclusion of our meeting, the patient and his wife felt that their questions and concerns were addressed.  The patient is scheduled to see Dr. Bryant on 2017.         GABRIEL JUNIOR MD             D: 2017 12:49   T: 2017 05:02   MT: [id number]      Name:     SAGE HOLLOWAY   MRN:      [id number]        Account:      [id number]   :      1960           Visit Date:   2017      Document: [id number]       cc: Sonia Bryant MD

## 2017-04-24 ENCOUNTER — HOSPITAL ENCOUNTER (OUTPATIENT)
Dept: ULTRASOUND IMAGING | Facility: CLINIC | Age: 57
End: 2017-04-24
Attending: SURGERY | Admitting: RADIOLOGY
Payer: COMMERCIAL

## 2017-04-24 ENCOUNTER — HOSPITAL ENCOUNTER (OUTPATIENT)
Facility: CLINIC | Age: 57
Discharge: HOME | End: 2017-04-24
Attending: RADIOLOGY | Admitting: RADIOLOGY
Payer: COMMERCIAL

## 2017-04-24 VITALS
OXYGEN SATURATION: 99 % | RESPIRATION RATE: 18 BRPM | HEART RATE: 90 BPM | TEMPERATURE: 97.9 F | DIASTOLIC BLOOD PRESSURE: 88 MMHG | SYSTOLIC BLOOD PRESSURE: 135 MMHG

## 2017-04-24 DIAGNOSIS — R19.00 RETROPERITONEAL MASS: ICD-10-CM

## 2017-04-24 PROCEDURE — 88341 IMHCHEM/IMCYTCHM EA ADD ANTB: CPT | Performed by: RADIOLOGY

## 2017-04-24 PROCEDURE — 00000159 ZZHCL STATISTIC H-SEND OUTS PREP: Performed by: RADIOLOGY

## 2017-04-24 PROCEDURE — 88271 CYTOGENETICS DNA PROBE: CPT | Performed by: RADIOLOGY

## 2017-04-24 PROCEDURE — 00000093 ZZHCL STATISTIC COURTESY CONSULT: Performed by: RADIOLOGY

## 2017-04-24 PROCEDURE — 88275 CYTOGENETICS 100-300: CPT | Performed by: RADIOLOGY

## 2017-04-24 PROCEDURE — 88161 CYTOPATH SMEAR OTHER SOURCE: CPT | Performed by: RADIOLOGY

## 2017-04-24 PROCEDURE — 88342 IMHCHEM/IMCYTCHM 1ST ANTB: CPT | Mod: 26 | Performed by: RADIOLOGY

## 2017-04-24 PROCEDURE — 88161 CYTOPATH SMEAR OTHER SOURCE: CPT | Mod: 26 | Performed by: RADIOLOGY

## 2017-04-24 PROCEDURE — 88173 CYTOPATH EVAL FNA REPORT: CPT | Mod: 26 | Performed by: RADIOLOGY

## 2017-04-24 PROCEDURE — 88341 IMHCHEM/IMCYTCHM EA ADD ANTB: CPT | Mod: 26 | Performed by: RADIOLOGY

## 2017-04-24 PROCEDURE — 88172 CYTP DX EVAL FNA 1ST EA SITE: CPT | Mod: 26 | Performed by: RADIOLOGY

## 2017-04-24 PROCEDURE — 88305 TISSUE EXAM BY PATHOLOGIST: CPT | Mod: 26 | Performed by: RADIOLOGY

## 2017-04-24 PROCEDURE — 88173 CYTOPATH EVAL FNA REPORT: CPT | Performed by: RADIOLOGY

## 2017-04-24 PROCEDURE — 40000863 ZZH STATISTIC RADIOLOGY XRAY, US, CT, MAR, NM

## 2017-04-24 PROCEDURE — 88342 IMHCHEM/IMCYTCHM 1ST ANTB: CPT | Performed by: RADIOLOGY

## 2017-04-24 PROCEDURE — 25000125 ZZHC RX 250: Performed by: RADIOLOGY

## 2017-04-24 PROCEDURE — 88305 TISSUE EXAM BY PATHOLOGIST: CPT | Performed by: RADIOLOGY

## 2017-04-24 PROCEDURE — 88172 CYTP DX EVAL FNA 1ST EA SITE: CPT | Performed by: RADIOLOGY

## 2017-04-24 PROCEDURE — 49180 BIOPSY ABDOMINAL MASS: CPT

## 2017-04-24 RX ORDER — LIDOCAINE 40 MG/G
CREAM TOPICAL
Status: DISCONTINUED | OUTPATIENT
Start: 2017-04-24 | End: 2017-04-24 | Stop reason: HOSPADM

## 2017-04-24 RX ORDER — LIDOCAINE HYDROCHLORIDE 10 MG/ML
10 INJECTION, SOLUTION EPIDURAL; INFILTRATION; INTRACAUDAL; PERINEURAL ONCE
Status: COMPLETED | OUTPATIENT
Start: 2017-04-24 | End: 2017-04-24

## 2017-04-24 RX ADMIN — LIDOCAINE HYDROCHLORIDE 100 MG: 10 INJECTION, SOLUTION EPIDURAL; INFILTRATION; INTRACAUDAL; PERINEURAL at 14:39

## 2017-04-24 RX ADMIN — LIDOCAINE HYDROCHLORIDE 1 ML: 10 INJECTION, SOLUTION EPIDURAL; INFILTRATION; INTRACAUDAL; PERINEURAL at 14:40

## 2017-04-24 NOTE — IP AVS SNAPSHOT
[place] [address]    [place] [address]    Phone:  [phone]                                       After Visit Summary   4/24/2017    Rex Jensen    MRN: [id number]           After Visit Summary Signature Page     I have received my discharge instructions, and my questions have been answered. I have discussed any challenges I see with this plan with the nurse or doctor.    ..........................................................................................................................................  Patient/Patient Representative Signature      ..........................................................................................................................................  Patient Representative Print Name and Relationship to Patient    ..................................................               ................................................  Date                                            Time    ..........................................................................................................................................  Reviewed by Signature/Title    ...................................................              ..............................................  Date                                                            Time

## 2017-04-24 NOTE — IP AVS SNAPSHOT
MRN:[id number]                      After Visit Summary   4/24/2017    Rex Jensen    MRN: [id number]           Visit Information        Department      4/24/2017 11:44 AM [facility]          Review of your medicines      UNREVIEWED medicines. Ask your doctor about these medicines        Dose / Directions    amitriptyline 10 MG tablet   Commonly known as:  ELAVIL   Used for:  Chronic nonintractable headache, unspecified headache type        Take 1-2 tablets by mouth at bedtime as needed.   Quantity:  60 tablet   Refills:  2       aspirin 325 MG EC tablet   Used for:  Other malaise and fatigue        ONE DAILY   Quantity:  100   Refills:  3       cetirizine 10 MG tablet   Commonly known as:  zyrTEC   Used for:  Cholinergic urticaria, Seasonal allergic rhinitis due to other allergic trigger        Dose:  10 mg   Take 1 tablet (10 mg) by mouth every evening   Quantity:  90 tablet   Refills:  3       HYDROcodone-acetaminophen  MG per tablet   Commonly known as:  NORCO        Dose:  1 tablet   Take 1 tablet by mouth every 6 hours as needed for moderate to severe pain   Refills:  0       pantoprazole 40 MG EC tablet   Commonly known as:  PROTONIX   Used for:  Gastroesophageal reflux disease with esophagitis        Dose:  40 mg   Take 1 tablet (40 mg) by mouth daily   Quantity:  90 tablet   Refills:  3       senna-docusate 8.6-50 MG per tablet   Commonly known as:  SENOKOT-S;PERICOLACE   Used for:  Abdominal pain, left lower quadrant        Dose:  1 tablet   Take 1 tablet by mouth daily   Quantity:  100 tablet   Refills:  0                Protect others around you: Learn how to safely use, store and throw away your medicines at www.disposemymeds.org.         Follow-ups after your visit        Your next 10 appointments already scheduled     Apr 24, 2017  1:00 PM CDT   CT ABDOMEN RETROPERITONEAL BIOPSY with SHUS4   [facility] Ultrasound ([facility])     [address] [facility] [id number] [phone]           Plan for an adult to drive you home and stay with you until morning.  Tell your doctor in advance:   If you have any allergies.   If you are breastfeeding or there s any chance you are pregnant.  Please bring any scans or X-rays taken at other hospitals, if they may be helpful. Also bring a list of your medicines, including vitamins, minerals and over-the-counter drugs. It is safest to leave valuables at home.  If you take blood thinners, you may need to stop taking them a few days before treatment. Talk to your doctor before stopping these medicines. You will need a blood test the morning of your exam.   Stop taking Coumadin (warfarin) 5 days before treatment. Restart the day after treatment.   If you take aspirin, you may need to stop taking it 3 days before treatment.   If you take Plavix, Ticlid, Pletal or Persantine, you may need to stop taking them 5 days before your scan.  If you have diabetes:   If you take insulin, call your diabetes care team. Ask if you should adjust your insulin before this test.   If your kidney function is normal, continue taking your metformin (Avandamet, Glucophage, Glucovance, Metaglip) on the day of your exam.   If your kidney function is abnormal, wait 48 hours before restarting this medicine.  If you have any questions, please call the imaging department where you will have your exam.  The day before your exam: Drink extra fluids at least six 8-ounce glasses (unless your doctor tells you to restrict fluids). The day of your exam: No eating or drinking for 4 hours before your test. You may take medicine with small sips of water.            May 09, 2017   Procedure with Alvin Bryant MD   [facility], [place], Same Day Surgery (--)    [address] [facility] [address] [phone]               Care Instructions        Further instructions from your care team       Superficial Biopsy Discharge Instructions      After you go home:      You may resume your normal diet    Care of Puncture Site:      You may have mild bruising, soreness & swelling at the puncture site. This will go away in a few days    For swelling & bruising, you may use an ice pack on the site.     Activity:      You may go back to your normal activity    Avoid strenuous activity for 24 hours    Medicines:      You may resume all medications    For minor pain, you may take Acetaminophen (Tylenol) or Ibuprofen (Advil)            Call the provider who ordered this test if:      Increased redness or swelling at the site    Fluid or blood oozing from the site    Severe pain at the site    Chills or a fever greater than 101 F (38 C).    Any questions or concerns    Call  911 or go to the Emergency Room if:      Trouble breathing    Your neck swells    Bleeding that you cannot control    Severe difficulty swallowing    If you have questions call:      [facility] Radiology Dept @ [phone]    The provider who performed your procedure was Dr Galarza.     Additional Information About Your Visit        [facility] Information     [employer] gives you secure access to your electronic health record. If you see a primary care provider, you can also send messages to your care team and make appointments. If you have questions, please call your primary care clinic.  If you do not have a primary care provider, please call [phone] and they will assist you.        Care EveryWhere ID     This is your Care EveryWhere ID. This could be used by other organizations to access your [facility] medical records  [id number]         Primary Care Provider Office Phone # Fax #    Js Magdaleno PA-C [phone]      Thank you!     Thank you for choosing [facility] for your care. Our goal is always to provide you with excellent care. Hearing back from our patients is one way we can continue to improve our services. Please take a few minutes to complete the  written survey that you may receive in the mail after you visit with us. Thank you!             Medication List: This is a list of all your medications and when to take them. Check marks below indicate your daily home schedule. Keep this list as a reference.      Medications           Morning Afternoon Evening Bedtime As Needed    amitriptyline 10 MG tablet   Commonly known as:  ELAVIL   Take 1-2 tablets by mouth at bedtime as needed.                                aspirin 325 MG EC tablet   ONE DAILY                                cetirizine 10 MG tablet   Commonly known as:  zyrTEC   Take 1 tablet (10 mg) by mouth every evening                                HYDROcodone-acetaminophen  MG per tablet   Commonly known as:  NORCO   Take 1 tablet by mouth every 6 hours as needed for moderate to severe pain                                pantoprazole 40 MG EC tablet   Commonly known as:  PROTONIX   Take 1 tablet (40 mg) by mouth daily                                senna-docusate 8.6-50 MG per tablet   Commonly known as:  SENOKOT-S;PERICOLACE   Take 1 tablet by mouth daily

## 2017-04-24 NOTE — PROGRESS NOTES
1525 pt back to room 1500. No sedation. Denies pain. Biopsy site CDI with bandaid. Pt given water, refused food. Son here with him. Has copy of discharge instsruction sheet from [facility].  1540 VSS. Denies pain. Biopsy site CDI with bandaid. Will discharge at 1545 with son, pt prefers to walk out.

## 2017-04-24 NOTE — DISCHARGE INSTRUCTIONS
Superficial Biopsy Discharge Instructions     After you go home:      You may resume your normal diet    Care of Puncture Site:      You may have mild bruising, soreness & swelling at the puncture site. This will go away in a few days    For swelling & bruising, you may use an ice pack on the site.     Activity:      You may go back to your normal activity    Avoid strenuous activity for 24 hours    Medicines:      You may resume all medications    For minor pain, you may take Acetaminophen (Tylenol) or Ibuprofen (Advil)            Call the provider who ordered this test if:      Increased redness or swelling at the site    Fluid or blood oozing from the site    Severe pain at the site    Chills or a fever greater than 101 F (38 C).    Any questions or concerns    Call  911 or go to the Emergency Room if:      Trouble breathing    Your neck swells    Bleeding that you cannot control    Severe difficulty swallowing    If you have questions call:      [facility] Radiology Dept @ [phone]    The provider who performed your procedure was Dr Galarza.

## 2017-04-24 NOTE — PROCEDURES
RADIOLOGY PROCEDURE NOTE  Patient name: Rex Jensen  MRN: [id number]  : 1960    Pre-procedure diagnosis: biopsy  Post-procedure diagnosis: Same    Procedure Date/Time: 2017  3:48 PM  Procedure: Left retroperitoneal mass biopsy.  > 10 18 g core biopsy samples obtained.  Sample sent to lab for evaluation.  Tolerated well.  Estimated blood loss: 10 ml    Specimen(s) collected with description: 18 g core biopsy samples  The patient tolerated the procedure well with no immediate complications.  Significant findings:  Please see above.    See imaging dictation for procedural details.    Provider name: Cristobal Galarza  Assistant(s):None

## 2017-04-26 ENCOUNTER — TELEPHONE (OUTPATIENT)
Dept: SURGERY | Facility: CLINIC | Age: 57
End: 2017-04-26

## 2017-04-26 NOTE — TELEPHONE ENCOUNTER
[facility] Call Center    Phone Message    Name of Caller: Rex    Phone Number: Home number on file [phone] (home)    Best time to return call: any    May a detailed message be left on voicemail: yes    Relation to patient: Self    Reason for Call: Other: Patient is calling to follow up on Biopsy results from 04/24/2017. Patient also states he has some general questions he would like to speak with Alexander Chavez about. Please advise.     Action Taken: Message routed to:  Adult Clinics: Surgery, General p [phone]

## 2017-04-28 ENCOUNTER — TELEPHONE (OUTPATIENT)
Dept: ONCOLOGY | Facility: CLINIC | Age: 57
End: 2017-04-28

## 2017-04-28 NOTE — TELEPHONE ENCOUNTER
Nurse called and left a message on VM that the results are still in process and he will get a call when they are back.    Jodi Oliver RN, BSN  [facility]  GI/Gen Surg/Hepatology Care Coordinator

## 2017-05-02 ENCOUNTER — ONCOLOGY VISIT (OUTPATIENT)
Dept: ONCOLOGY | Facility: CLINIC | Age: 57
End: 2017-05-02
Attending: INTERNAL MEDICINE
Payer: COMMERCIAL

## 2017-05-02 VITALS
RESPIRATION RATE: 18 BRPM | WEIGHT: 207.3 LBS | OXYGEN SATURATION: 99 % | BODY MASS INDEX: 27.47 KG/M2 | SYSTOLIC BLOOD PRESSURE: 114 MMHG | DIASTOLIC BLOOD PRESSURE: 90 MMHG | HEIGHT: 73 IN | TEMPERATURE: 97.8 F | HEART RATE: 74 BPM

## 2017-05-02 DIAGNOSIS — C49.9 SARCOMA OF SOFT TISSUE (H): Primary | ICD-10-CM

## 2017-05-02 PROCEDURE — 99212 OFFICE O/P EST SF 10 MIN: CPT | Mod: ZF

## 2017-05-02 PROCEDURE — 99205 OFFICE O/P NEW HI 60 MIN: CPT | Mod: ZP | Performed by: INTERNAL MEDICINE

## 2017-05-02 ASSESSMENT — ENCOUNTER SYMPTOMS
SPEECH CHANGE: 0
ABDOMINAL PAIN: 1
RECTAL PAIN: 0
BLOATING: 0
SKIN CHANGES: 0
TINGLING: 0
EYE IRRITATION: 0
DIZZINESS: 0
DOUBLE VISION: 0
MEMORY LOSS: 0
PARALYSIS: 0
EYE PAIN: 0
TREMORS: 0
JAUNDICE: 0
DISTURBANCES IN COORDINATION: 0
BLOOD IN STOOL: 0
RECTAL BLEEDING: 0
BOWEL INCONTINENCE: 0
HEARTBURN: 0
CONSTIPATION: 1
NAUSEA: 0
POOR WOUND HEALING: 0
LOSS OF CONSCIOUSNESS: 0
SEIZURES: 0
NAIL CHANGES: 0
DIARRHEA: 0
EYE WATERING: 0
WEAKNESS: 0
EYE REDNESS: 0
VOMITING: 0
NUMBNESS: 0
HEADACHES: 0

## 2017-05-02 ASSESSMENT — PAIN SCALES - GENERAL: PAINLEVEL: NO PAIN (0)

## 2017-05-02 NOTE — NURSING NOTE
"Oncology Rooming Note    May 2, 2017 8:20 AM   Rex Jensen is a 57 year old male who presents for: Oncology Clinic Visit    Initial Vitals: /90 (BP Location: Left arm, Patient Position: Chair, Cuff Size: Adult Regular)  Pulse 74  Temp 97.8  F (36.6  C) (Oral)  Resp 18  Ht 1.845 m (6' 0.64\")  Wt 94 kg (207 lb 4.8 oz)  SpO2 99%  BMI 27.62 kg/m2 Estimated body mass index is 27.62 kg/(m^2) as calculated from the following:    Height as of this encounter: 1.845 m (6' 0.64\").    Weight as of this encounter: 94 kg (207 lb 4.8 oz). Body surface area is 2.19 meters squared.  No Pain (0) Comment: Data Unavailable   No LMP for male patient.  Allergies reviewed: Yes  Medications reviewed: Yes    Medications: Medication refills not needed today.  Pharmacy [facility] entered into [facility]: [facility] [address] [place] [id number] [facility] [address]    Clinical concerns:No new concerns provider was notified.    7 minutes for nursing intake (face to face time)     Nieves Spencer MA                "

## 2017-05-02 NOTE — PROGRESS NOTES
I saw Mr. 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  In brief, he has had some abdominal pain for a few months, but this became more prominent leading to imaging revealing a retroperitoneal mass.  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  He is really feeling pretty well today.  Since this problem arose, he began a Ladarius diet, and has been doing a lot of juicing for the last 1 or 2 weeks.  He stopped all of his medications, and he feels that he feels better than he has in the past, and the pain is not really bothering him now.  The pain was fairly bad for the first couple of weeks before presentation.  His history is a bit complicated.  He has had some mild GERD in the past, and was on Protonix.  He also had headaches in the past, which responded Elavil, and he has a long history of skin itching for which he was taking Zyrtec.  He was also taking an aspirin a day to try to prevent the headaches.  Since stopping all of his medications, he has not had any more headaches, nor does he have any GERD.  Though his chart notes he had a history of depression, he states this is not correct, and he was really only on the Elavil for headaches.    He does not describe any true allergies, but has had bad reactions to codeine and morphine in the past.    He smoked about 10 years before stopping around the age of 25, and does not abuse alcohol or other drugs.  He works in construction.  He is , and came with his wife and an autistic son.  His son really is not able to function outside of the home, and Mr. Jensen is the main caregiver for him.      PAST MEDICAL HISTORY:  Notable for knee surgery about 20 years ago.      FAMILY HISTORY:  Noncontributory.      ON exam he appeared comfortable with a rather talkative affect.   /90 (BP Location: Left arm, Patient Position: Chair, Cuff Size: Adult Regular)  Pulse 74  Temp 97.8  F (36.6  C)  "(Oral)  Resp 18  Ht 1.845 m (6' 0.64\")  Wt 94 kg (207 lb 4.8 oz)  SpO2 99%  BMI 27.62 kg/m2  HEENT:  No icterus.  Full EOMs.  Normal oropharynx.   NECK:  Exam is unremarkable w no thyromegaly or mass.   CHEST:  Clear.   LYMPH:  No lymphadenopathy.  HEART:  RRR with no significant murmur.   ABDOMEN:  Not tender, but there is a fullness in the left flank region that is identifiable.   EXTREMITIES:  No edema.   NEUROLOGIC:  Romberg, heel and toe walking are normal.      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     I reviewed the imaging with him and his wife, showing the primary mass and also some nonspecific lung nodules, which may or may not be relevant.  I do note on imaging that comparing the left and the right, there is essentially normal fat distribution on the right, but in the region of the left kidney there is markedly more fat, which has a somewhat streaky appearance, and lower down the tumor is evident with some probable necrosis and appearing to infiltrate the musculature.  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  He does have at least 1 somewhat enlarged mesenteric lymph node, which was PET-positive with an SUV of 6.9 of unclear significance.        I discussed with him and his wife the nature of cancer in general, and his sarcoma in particular.  We talked about a lot of options, including preoperative chemotherapy with Doxil/Ifosfamide or gem with or without Taxotere, and we reviewed briefly some typical toxicities of these.  We also talked about preoperative radiation therapy.  I personally would think that preoperative chemotherapy would be quite reasonable in this case, but they would really like to proceed directly to surgery.  They are also interested in different alternative treatments, and would be interested in " repeat imaging to see if the diet therapy has had any effect.  We also discussed some treatment options in [place] in this regard.  We had a very long talk about all of this, and multiple questions were addressed.  Right now, they are actually going to [place] next week for a second opinion.  They will get an opinion at [place], and think about how they would like to proceed.  I told them it would be reasonable to get his restaging before any treatment, as it has now been about 2 weeks, and it would be wise to repeat imaging before surgery in any regard.  All of their questions were addressed, and they will call if they have others.   t>60 min most C&C  -  Vinod An M.D.  Professor  Hematology, Oncology and Transplantation  -  cc: Alvin Bryant MD    Department of Surgery     [facility]       Linn Hull MD    Department of Surgery    [facility]

## 2017-05-02 NOTE — MR AVS SNAPSHOT
After Visit Summary   5/2/2017    Rex Jensen    MRN: [id number]           Patient Information     Date Of Birth          1960        Visit Information        Provider Department      5/2/2017 8:00 AM Vinod An MD [facility]        Today's Diagnoses     Sarcoma of soft tissue (H)    -  1       Follow-ups after your visit        Your next 10 appointments already scheduled     May 09, 2017   Procedure with Alvin Bryant MD   [facility], [place], Same Day Surgery (--)    [address]  [place] [address] [phone]              Who to contact     If you have questions or need follow up information about today's clinic visit or your schedule please contact [facility] directly at [phone].  Normal or non-critical lab and imaging results will be communicated to you by MyChart, letter or phone within 4 business days after the clinic has received the results. If you do not hear from us within 7 days, please contact the clinic through MyChart or phone. If you have a critical or abnormal lab result, we will notify you by phone as soon as possible.  Submit refill requests through [facility] or call your pharmacy and they will forward the refill request to us. Please allow 3 business days for your refill to be completed.          Additional Information About Your Visit        MyChart Information     [facility] gives you secure access to your electronic health record. If you see a primary care provider, you can also send messages to your care team and make appointments. If you have questions, please call your primary care clinic.  If you do not have a primary care provider, please call [phone] and they will assist you.        Care EveryWhere ID     This is your Care EveryWhere ID. This could be used by other organizations to access your [place] medical records  [id number]        Your Vitals Were     Pulse Temperature Respirations Height Pulse  "Oximetry BMI (Body Mass Index)    74 97.8  F (36.6  C) (Oral) 18 1.845 m (6' 0.64\") 99% 27.62 kg/m2       Blood Pressure from Last 3 Encounters:   05/02/17 114/90   04/24/17 135/88   04/21/17 (!) 130/92    Weight from Last 3 Encounters:   05/02/17 94 kg (207 lb 4.8 oz)   04/21/17 96.8 kg (213 lb 4.8 oz)   04/20/17 97.1 kg (214 lb)              Today, you had the following     No orders found for display         Today's Medication Changes          These changes are accurate as of: 5/2/17 12:25 PM.  If you have any questions, ask your nurse or doctor.               Stop taking these medicines if you haven't already. Please contact your care team if you have questions.     amitriptyline 10 MG tablet   Commonly known as:  ELAVIL   Stopped by:  Vinod An MD           cetirizine 10 MG tablet   Commonly known as:  zyrTEC   Stopped by:  Vinod An MD           pantoprazole 40 MG EC tablet   Commonly known as:  PROTONIX   Stopped by:  Vinod An MD                    Primary Care Provider Office Phone # Fax #    Js Magdaleno PA-C [phone]       [facility] [place] [address] [facility] [address]        Thank you!     Thank you for choosing [facility]  for your care. Our goal is always to provide you with excellent care. Hearing back from our patients is one way we can continue to improve our services. Please take a few minutes to complete the written survey that you may receive in the mail after your visit with us. Thank you!             Your Updated Medication List - Protect others around you: Learn how to safely use, store and throw away your medicines at www.disposemymeds.org.          This list is accurate as of: 5/2/17 12:25 PM.  Always use your most recent med list.                   Brand Name Dispense Instructions for use    aspirin 325 MG EC tablet     100    ONE DAILY       HYDROcodone-acetaminophen  MG per tablet    " NORCO     Take 1 tablet by mouth every 6 hours as needed for moderate to severe pain Reported on 5/2/2017       senna-docusate 8.6-50 MG per tablet    SENOKOT-S;PERICOLACE    100 tablet    Take 1 tablet by mouth daily

## 2017-05-02 NOTE — LETTER
5/2/2017       RE: Rex Jensen  [address]  [facility] [address]     Dear Colleague,    Thank you for referring your patient, Rex Jensen, to the [facility]. Please see a copy of my visit note below.      I saw Mr. 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  In brief, he has had some abdominal pain for a few months, but this became more prominent leading to imaging revealing a retroperitoneal mass.  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  He is really feeling pretty well today.  Since this problem arose, he began a Ladarius diet, and has been doing a lot of juicing for the last 1 or 2 weeks.  He stopped all of his medications, and he feels that he feels better than he has in the past, and the pain is not really bothering him now.  The pain was fairly bad for the first couple of weeks before presentation.  His history is a bit complicated.  He has had some mild GERD in the past, and was on Protonix.  He also had headaches in the past, which responded Elavil, and he has a long history of skin itching for which he was taking Zyrtec.  He was also taking an aspirin a day to try to prevent the headaches.  Since stopping all of his medications, he has not had any more headaches, nor does he have any GERD.  Though his chart notes he had a history of depression, he states this is not correct, and he was really only on the Elavil for headaches.    He does not describe any true allergies, but has had bad reactions to codeine and morphine in the past.    He smoked about 10 years before stopping around the age of 25, and does not abuse alcohol or other drugs.  He works in construction.  He is , and came with his wife and an autistic son.  His son really is not able to function outside of the home, and Mr. Jensen is the main caregiver for him.      PAST MEDICAL HISTORY:  Notable for knee surgery about 20  "years ago.      FAMILY HISTORY:  Noncontributory.      ON exam he appeared comfortable with a rather talkative affect.   /90 (BP Location: Left arm, Patient Position: Chair, Cuff Size: Adult Regular)  Pulse 74  Temp 97.8  F (36.6  C) (Oral)  Resp 18  Ht 1.845 m (6' 0.64\")  Wt 94 kg (207 lb 4.8 oz)  SpO2 99%  BMI 27.62 kg/m2  HEENT:  No icterus.  Full EOMs.  Normal oropharynx.   NECK:  Exam is unremarkable w no thyromegaly or mass.   CHEST:  Clear.   LYMPH:  No lymphadenopathy.  HEART:  RRR with no significant murmur.   ABDOMEN:  Not tender, but there is a fullness in the left flank region that is identifiable.   EXTREMITIES:  No edema.   NEUROLOGIC:  Romberg, heel and toe walking are normal.      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     I reviewed the imaging with him and his wife, showing the primary mass and also some nonspecific lung nodules, which may or may not be relevant.  I do note on imaging that comparing the left and the right, there is essentially normal fat distribution on the right, but in the region of the left kidney there is markedly more fat, which has a somewhat streaky appearance, and lower down the tumor is evident with some probable necrosis and appearing to infiltrate the musculature.  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  He does have at least 1 somewhat enlarged mesenteric lymph node, which was PET-positive with an SUV of 6.9 of unclear significance.        I discussed with him and his wife the nature of cancer in general, and his sarcoma in particular.  We talked about a lot of options, including preoperative chemotherapy with Doxil/Ifosfamide or gem with or without Taxotere, and we reviewed briefly some typical toxicities of these.  We also talked about preoperative radiation therapy.  I " personally would think that preoperative chemotherapy would be quite reasonable in this case, but they would really like to proceed directly to surgery.  They are also interested in different alternative treatments, and would be interested in repeat imaging to see if the diet therapy has had any effect.  We also discussed some treatment options in [place] in this regard.  We had a very long talk about all of this, and multiple questions were addressed.  Right now, they are actually going to [place] next week for a second opinion.  They will get an opinion at [place], and think about how they would like to proceed.  I told them it would be reasonable to get his restaging before any treatment, as it has now been about 2 weeks, and it would be wise to repeat imaging before surgery in any regard.  All of their questions were addressed, and they will call if they have others.   t>60 min most C&C  -  Vinod An M.D.  Professor  Hematology, Oncology and Transplantation  -  cc: Alvin Bryant MD    Department of Surgery     [facility]       Linn Hull MD    Department of Surgery    [facility]

## 2017-05-03 ENCOUNTER — TELEPHONE (OUTPATIENT)
Dept: ONCOLOGY | Facility: CLINIC | Age: 57
End: 2017-05-03

## 2017-05-03 NOTE — TELEPHONE ENCOUNTER
"ONCOLOGY SOCIAL WORK  D) Rex is a 57-yr-old gentleman with newly dx'd sarcoma  I) Distress Screening Tool  A) P/c to pt regarding need for resources.  They went to [place] today for a second opinion and have decided to have surgery there \"without the chemo and radn\"  P) informed team.  No further follow up    Cindy Sanchez, LICSW  [phone]    "

## 2017-05-05 LAB — COPATH REPORT: NORMAL

## 2017-05-08 ENCOUNTER — ANESTHESIA EVENT (OUTPATIENT)
Dept: SURGERY | Facility: CLINIC | Age: 57
DRG: 828 | End: 2017-05-08
Payer: COMMERCIAL

## 2017-05-08 ASSESSMENT — LIFESTYLE VARIABLES: TOBACCO_USE: 1

## 2017-05-08 NOTE — ANESTHESIA PREPROCEDURE EVALUATION
Anesthesia Evaluation     . Pt has had prior anesthetic. Type: General    No history of anesthetic complications          ROS/MED HX    ENT/Pulmonary:     (+)tobacco use, Past use , . .    Neurologic: Comment: Headaches      Cardiovascular:  - neg cardiovascular ROS   (+) ----. : . . . :. . Previous cardiac testing date:results:date: results:ECG reviewed date:10/5/16 results:Sinus bradycardia (heart rate 55 bpm) date: results:          METS/Exercise Tolerance:  >4 METS   Hematologic:  - neg hematologic  ROS       Musculoskeletal: Comment: DDD        GI/Hepatic:     (+) GERD       Renal/Genitourinary:         Endo:  - neg endo ROS       Psychiatric:     (+) psychiatric history depression      Infectious Disease: Comment: Hx of Lyme disease        Malignancy:   (+) Malignancy   Left retroperitoneal mass (biopsy - soft tissue sarcoma); 11.5 cm mass involving left iliopsoas        Other:                     Physical Exam  Normal systems: cardiovascular and pulmonary    Airway   Mallampati: III  TM distance: >3 FB  Neck ROM: full    Dental   Comment: Bridge    Cardiovascular   Rhythm and rate: regular and normal      Pulmonary                     Lab Results   Component Value Date    WBC 7.8 04/20/2017    WBC 6.6 09/16/2016    WBC 8.7 03/17/2015    HGB 14.2 04/20/2017    HGB 15.4 09/16/2016    HGB 15.5 03/17/2015    HCT 43.7 04/20/2017    HCT 45.3 09/16/2016    HCT 46.0 03/17/2015     04/20/2017     09/16/2016     03/17/2015     04/20/2017     09/16/2016     03/17/2015    POTASSIUM 4.7 04/20/2017    POTASSIUM 4.8 10/05/2016    POTASSIUM 4.3 09/16/2016    CHLORIDE 104 04/20/2017    CHLORIDE 103 09/16/2016    CHLORIDE 104 03/17/2015    CO2 29 04/20/2017    CO2 26 09/16/2016    CO2 30 03/17/2015    BUN 16 04/20/2017    BUN 23 09/16/2016    BUN 23 03/17/2015    CR 1.13 04/20/2017    CR 1.11 09/16/2016    CR 1.13 03/17/2015    GLC 92 04/20/2017    GLC 83 09/16/2016     (H)  03/17/2015    SED 5 11/11/2008    SED 4 02/20/2007    SED 3 05/24/2004    TROPI  10/05/2016     <0.015  The 99th percentile for upper reference range is 0.045 ug/L.  Troponin values in   the range of 0.045 - 0.120 ug/L may be associated with risks of adverse   clinical events.      AST 23 09/16/2016    AST 7 03/17/2015    AST 24 02/20/2007    ALT 49 09/16/2016    ALT 25 03/17/2015    ALT 34 02/20/2007    ALKPHOS 84 09/16/2016    ALKPHOS 84 03/17/2015    ALKPHOS 89 02/20/2007    BILITOTAL 1.7 (H) 09/16/2016    BILITOTAL 0.6 03/17/2015    BILITOTAL 0.9 02/20/2007    INR 1.03 04/20/2017       Anesthesia Plan      History & Physical Review  History and physical reviewed and following examination; no interval change.    ASA Status:  2 .    NPO Status:  > 8 hours    Plan for General and ETT with Intravenous and Propofol induction. Maintenance will be Inhalation and Balanced.    PONV prophylaxis:  Ondansetron (or other 5HT-3)  Additional equipment: 2nd IV and Arterial Line   Glenda Moon MD  CA 2 Anesthesia Resident  Pager [phone]      Postoperative Care  Postoperative pain management:  IV analgesics.      Consents  Anesthetic plan, risks, benefits and alternatives discussed with:  Patient.  Use of blood products discussed: Yes.   Consented to blood products.  .        Procedure:  Procedure(s):  Resection Retroperitoneal Mass   Anesthesia general with Block    - Wound Class: I-Clean    Patient Active Problem List   Diagnosis     Esophageal reflux     SHORT TERM MEMORY PROBLEMS     DYSTHYMIA/DEPRESSIVE DISORDER NEC     Benign neoplasm of scalp and skin of neck     Disturbance of skin sensation     DEGENERATED DISK DISEASE CERVICAL-NO MYELOPATHY     CARDIOVASCULAR SCREENING; LDL GOAL LESS THAN 130     Advanced directives, counseling/discussion     Retroperitoneal mass     Sarcoma of soft tissue (H)       Past Medical History:   Diagnosis Date     Esophageal reflux      Lyme disease      Memory loss     Trouble with memory  loss, ability to think clearly, and  concentration.     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       Past Surgical History:   Procedure Laterality Date     HC ARTHROTOMY/EXPLORE/TREAT KNEE JOINT      kicked by cow         No current facility-administered medications on file prior to encounter.   No current outpatient prescriptions on file prior to encounter.    Allergies   Allergen Reactions     Codeine      Other reaction(s): Intolerance-Can't Take  halluciation     Morphine      Other reaction(s): Intolerance-Can't Take       Recent Labs   Lab Test  05/09/17   0658   HGB  13.6     Recent Labs   Lab Test  04/20/17   1433   POTASSIUM  4.7     Recent Labs   Lab Test  04/20/17   1433   PLT  249     Recent Labs   Lab Test  04/20/17   1433   INR  1.03       No results found for this or any previous visit (from the past 4320 hour(s)).      Attending Anesthesiologist    Jaquan Muñiz MD    *[phone]                    .

## 2017-05-09 ENCOUNTER — ANESTHESIA (OUTPATIENT)
Dept: SURGERY | Facility: CLINIC | Age: 57
DRG: 828 | End: 2017-05-09
Payer: COMMERCIAL

## 2017-05-09 ENCOUNTER — HOSPITAL ENCOUNTER (INPATIENT)
Facility: CLINIC | Age: 57
LOS: 2 days | Discharge: HOME | DRG: 828 | End: 2017-05-11
Attending: SURGERY | Admitting: SURGERY
Payer: COMMERCIAL

## 2017-05-09 DIAGNOSIS — Z78.9 DEEP VEIN THROMBOSIS (DVT) PROPHYLAXIS PRESCRIBED AT DISCHARGE: ICD-10-CM

## 2017-05-09 DIAGNOSIS — G89.18 POSTOPERATIVE PAIN: Primary | ICD-10-CM

## 2017-05-09 PROBLEM — C48.0 RETROPERITONEAL SARCOMA (H): Status: ACTIVE | Noted: 2017-05-09

## 2017-05-09 LAB
ABO + RH BLD: NORMAL
ABO + RH BLD: NORMAL
BASE EXCESS BLDA CALC-SCNC: 0.2 MMOL/L
BLD GP AB SCN SERPL QL: NORMAL
BLOOD BANK CMNT PATIENT-IMP: NORMAL
CA-I BLD-MCNC: 4.9 MG/DL (ref 4.4–5.2)
GLUCOSE BLD-MCNC: 128 MG/DL (ref 70–99)
HCO3 BLD-SCNC: 24 MMOL/L (ref 21–28)
HGB BLD-MCNC: 11.6 G/DL (ref 13.3–17.7)
HGB BLD-MCNC: 13.6 G/DL (ref 13.3–17.7)
O2/TOTAL GAS SETTING VFR VENT: 38 %
PCO2 BLD: 36 MM HG (ref 35–45)
PH BLD: 7.44 PH (ref 7.35–7.45)
PO2 BLD: 116 MM HG (ref 80–105)
POTASSIUM BLD-SCNC: 3.9 MMOL/L (ref 3.4–5.3)
SODIUM BLD-SCNC: 137 MMOL/L (ref 133–144)
SPECIMEN EXP DATE BLD: NORMAL

## 2017-05-09 PROCEDURE — 88309 TISSUE EXAM BY PATHOLOGIST: CPT | Performed by: SURGERY

## 2017-05-09 PROCEDURE — 12000008 ZZH R&B INTERMEDIATE UMMC

## 2017-05-09 PROCEDURE — 25000128 H RX IP 250 OP 636: Performed by: ANESTHESIOLOGY

## 2017-05-09 PROCEDURE — 25000566 ZZH SEVOFLURANE, EA 15 MIN: Performed by: SURGERY

## 2017-05-09 PROCEDURE — 36000064 ZZH SURGERY LEVEL 4 EA 15 ADDTL MIN - UMMC: Performed by: SURGERY

## 2017-05-09 PROCEDURE — 37000008 ZZH ANESTHESIA TECHNICAL FEE, 1ST 30 MIN: Performed by: SURGERY

## 2017-05-09 PROCEDURE — 40000171 ZZH STATISTIC PRE-PROCEDURE ASSESSMENT III: Performed by: SURGERY

## 2017-05-09 PROCEDURE — C9290 INJ, BUPIVACAINE LIPOSOME: HCPCS | Performed by: ANESTHESIOLOGY

## 2017-05-09 PROCEDURE — 86850 RBC ANTIBODY SCREEN: CPT | Performed by: ANESTHESIOLOGY

## 2017-05-09 PROCEDURE — 36000062 ZZH SURGERY LEVEL 4 1ST 30 MIN - UMMC: Performed by: SURGERY

## 2017-05-09 PROCEDURE — 85018 HEMOGLOBIN: CPT | Performed by: ANESTHESIOLOGY

## 2017-05-09 PROCEDURE — 88305 TISSUE EXAM BY PATHOLOGIST: CPT | Performed by: SURGERY

## 2017-05-09 PROCEDURE — 71000015 ZZH RECOVERY PHASE 1 LEVEL 2 EA ADDTL HR: Performed by: SURGERY

## 2017-05-09 PROCEDURE — 25000125 ZZHC RX 250: Performed by: ANESTHESIOLOGY

## 2017-05-09 PROCEDURE — 88331 PATH CONSLTJ SURG 1 BLK 1SPC: CPT | Performed by: SURGERY

## 2017-05-09 PROCEDURE — 84132 ASSAY OF SERUM POTASSIUM: CPT | Performed by: ANESTHESIOLOGY

## 2017-05-09 PROCEDURE — 27210794 ZZH OR GENERAL SUPPLY STERILE: Performed by: SURGERY

## 2017-05-09 PROCEDURE — 40000275 ZZH STATISTIC RCP TIME EA 10 MIN

## 2017-05-09 PROCEDURE — 25000125 ZZHC RX 250: Performed by: SURGERY

## 2017-05-09 PROCEDURE — C9399 UNCLASSIFIED DRUGS OR BIOLOG: HCPCS | Performed by: ANESTHESIOLOGY

## 2017-05-09 PROCEDURE — 25800025 ZZH RX 258: Performed by: SURGERY

## 2017-05-09 PROCEDURE — 0DBW0ZZ EXCISION OF PERITONEUM, OPEN APPROACH: ICD-10-PCS | Performed by: SURGERY

## 2017-05-09 PROCEDURE — 25800025 ZZH RX 258: Performed by: ANESTHESIOLOGY

## 2017-05-09 PROCEDURE — 86900 BLOOD TYPING SEROLOGIC ABO: CPT | Performed by: ANESTHESIOLOGY

## 2017-05-09 PROCEDURE — 86901 BLOOD TYPING SEROLOGIC RH(D): CPT | Performed by: ANESTHESIOLOGY

## 2017-05-09 PROCEDURE — 0KBP0ZZ EXCISION OF LEFT HIP MUSCLE, OPEN APPROACH: ICD-10-PCS | Performed by: SURGERY

## 2017-05-09 PROCEDURE — 0WBH0ZZ EXCISION OF RETROPERITONEUM, OPEN APPROACH: ICD-10-PCS | Performed by: SURGERY

## 2017-05-09 PROCEDURE — 36415 COLL VENOUS BLD VENIPUNCTURE: CPT | Performed by: ANESTHESIOLOGY

## 2017-05-09 PROCEDURE — 71000014 ZZH RECOVERY PHASE 1 LEVEL 2 FIRST HR: Performed by: SURGERY

## 2017-05-09 PROCEDURE — 82947 ASSAY GLUCOSE BLOOD QUANT: CPT | Performed by: ANESTHESIOLOGY

## 2017-05-09 PROCEDURE — 25000125 ZZHC RX 250: Performed by: STUDENT IN AN ORGANIZED HEALTH CARE EDUCATION/TRAINING PROGRAM

## 2017-05-09 PROCEDURE — 82803 BLOOD GASES ANY COMBINATION: CPT | Performed by: ANESTHESIOLOGY

## 2017-05-09 PROCEDURE — 82330 ASSAY OF CALCIUM: CPT | Performed by: ANESTHESIOLOGY

## 2017-05-09 PROCEDURE — 25000128 H RX IP 250 OP 636: Performed by: SURGERY

## 2017-05-09 PROCEDURE — 37000009 ZZH ANESTHESIA TECHNICAL FEE, EACH ADDTL 15 MIN: Performed by: SURGERY

## 2017-05-09 PROCEDURE — P9041 ALBUMIN (HUMAN),5%, 50ML: HCPCS | Performed by: ANESTHESIOLOGY

## 2017-05-09 PROCEDURE — 25000128 H RX IP 250 OP 636: Performed by: STUDENT IN AN ORGANIZED HEALTH CARE EDUCATION/TRAINING PROGRAM

## 2017-05-09 PROCEDURE — 84295 ASSAY OF SERUM SODIUM: CPT | Performed by: ANESTHESIOLOGY

## 2017-05-09 PROCEDURE — 40000014 ZZH STATISTIC ARTERIAL MONITORING DAILY

## 2017-05-09 RX ORDER — SODIUM CHLORIDE, SODIUM LACTATE, POTASSIUM CHLORIDE, CALCIUM CHLORIDE 600; 310; 30; 20 MG/100ML; MG/100ML; MG/100ML; MG/100ML
INJECTION, SOLUTION INTRAVENOUS CONTINUOUS PRN
Status: DISCONTINUED | OUTPATIENT
Start: 2017-05-09 | End: 2017-05-09

## 2017-05-09 RX ORDER — NALOXONE HYDROCHLORIDE 0.4 MG/ML
.1-.4 INJECTION, SOLUTION INTRAMUSCULAR; INTRAVENOUS; SUBCUTANEOUS
Status: DISCONTINUED | OUTPATIENT
Start: 2017-05-09 | End: 2017-05-11 | Stop reason: HOSPADM

## 2017-05-09 RX ORDER — ONDANSETRON 2 MG/ML
4 INJECTION INTRAMUSCULAR; INTRAVENOUS EVERY 30 MIN PRN
Status: DISCONTINUED | OUTPATIENT
Start: 2017-05-09 | End: 2017-05-09 | Stop reason: HOSPADM

## 2017-05-09 RX ORDER — FENTANYL CITRATE 50 UG/ML
25-50 INJECTION, SOLUTION INTRAMUSCULAR; INTRAVENOUS
Status: DISCONTINUED | OUTPATIENT
Start: 2017-05-09 | End: 2017-05-09 | Stop reason: HOSPADM

## 2017-05-09 RX ORDER — CEFAZOLIN SODIUM 2 G/100ML
2 INJECTION, SOLUTION INTRAVENOUS
Status: COMPLETED | OUTPATIENT
Start: 2017-05-09 | End: 2017-05-09

## 2017-05-09 RX ORDER — ACETAMINOPHEN 10 MG/ML
1000 INJECTION, SOLUTION INTRAVENOUS EVERY 8 HOURS
Status: DISCONTINUED | OUTPATIENT
Start: 2017-05-09 | End: 2017-05-10

## 2017-05-09 RX ORDER — SODIUM CHLORIDE, SODIUM LACTATE, POTASSIUM CHLORIDE, CALCIUM CHLORIDE 600; 310; 30; 20 MG/100ML; MG/100ML; MG/100ML; MG/100ML
INJECTION, SOLUTION INTRAVENOUS CONTINUOUS
Status: DISCONTINUED | OUTPATIENT
Start: 2017-05-09 | End: 2017-05-09 | Stop reason: HOSPADM

## 2017-05-09 RX ORDER — ONDANSETRON 2 MG/ML
4 INJECTION INTRAMUSCULAR; INTRAVENOUS EVERY 6 HOURS PRN
Status: DISCONTINUED | OUTPATIENT
Start: 2017-05-09 | End: 2017-05-11 | Stop reason: HOSPADM

## 2017-05-09 RX ORDER — NALOXONE HYDROCHLORIDE 0.4 MG/ML
.1-.4 INJECTION, SOLUTION INTRAMUSCULAR; INTRAVENOUS; SUBCUTANEOUS
Status: DISCONTINUED | OUTPATIENT
Start: 2017-05-09 | End: 2017-05-09 | Stop reason: HOSPADM

## 2017-05-09 RX ORDER — SIMETHICONE 40MG/0.6ML
40 SUSPENSION, DROPS(FINAL DOSAGE FORM)(ML) ORAL EVERY 6 HOURS PRN
Status: DISCONTINUED | OUTPATIENT
Start: 2017-05-09 | End: 2017-05-11 | Stop reason: HOSPADM

## 2017-05-09 RX ORDER — EPHEDRINE SULFATE 50 MG/ML
INJECTION, SOLUTION INTRAMUSCULAR; INTRAVENOUS; SUBCUTANEOUS PRN
Status: DISCONTINUED | OUTPATIENT
Start: 2017-05-09 | End: 2017-05-09

## 2017-05-09 RX ORDER — CEFAZOLIN SODIUM 1 G/3ML
1 INJECTION, POWDER, FOR SOLUTION INTRAMUSCULAR; INTRAVENOUS SEE ADMIN INSTRUCTIONS
Status: DISCONTINUED | OUTPATIENT
Start: 2017-05-09 | End: 2017-05-09 | Stop reason: HOSPADM

## 2017-05-09 RX ORDER — DEXAMETHASONE SODIUM PHOSPHATE 4 MG/ML
INJECTION, SOLUTION INTRA-ARTICULAR; INTRALESIONAL; INTRAMUSCULAR; INTRAVENOUS; SOFT TISSUE PRN
Status: DISCONTINUED | OUTPATIENT
Start: 2017-05-09 | End: 2017-05-09

## 2017-05-09 RX ORDER — SODIUM CHLORIDE, SODIUM LACTATE, POTASSIUM CHLORIDE, CALCIUM CHLORIDE 600; 310; 30; 20 MG/100ML; MG/100ML; MG/100ML; MG/100ML
INJECTION, SOLUTION INTRAVENOUS CONTINUOUS
Status: DISCONTINUED | OUTPATIENT
Start: 2017-05-09 | End: 2017-05-11 | Stop reason: HOSPADM

## 2017-05-09 RX ORDER — LIDOCAINE HYDROCHLORIDE 20 MG/ML
INJECTION, SOLUTION INFILTRATION; PERINEURAL PRN
Status: DISCONTINUED | OUTPATIENT
Start: 2017-05-09 | End: 2017-05-09

## 2017-05-09 RX ORDER — PROPOFOL 10 MG/ML
INJECTION, EMULSION INTRAVENOUS PRN
Status: DISCONTINUED | OUTPATIENT
Start: 2017-05-09 | End: 2017-05-09

## 2017-05-09 RX ORDER — ONDANSETRON 4 MG/1
4 TABLET, ORALLY DISINTEGRATING ORAL EVERY 6 HOURS PRN
Status: DISCONTINUED | OUTPATIENT
Start: 2017-05-09 | End: 2017-05-11 | Stop reason: HOSPADM

## 2017-05-09 RX ORDER — GLYCOPYRROLATE 0.2 MG/ML
INJECTION, SOLUTION INTRAMUSCULAR; INTRAVENOUS PRN
Status: DISCONTINUED | OUTPATIENT
Start: 2017-05-09 | End: 2017-05-09

## 2017-05-09 RX ORDER — ALBUMIN, HUMAN INJ 5% 5 %
SOLUTION INTRAVENOUS CONTINUOUS PRN
Status: DISCONTINUED | OUTPATIENT
Start: 2017-05-09 | End: 2017-05-09

## 2017-05-09 RX ORDER — ONDANSETRON 4 MG/1
4 TABLET, ORALLY DISINTEGRATING ORAL EVERY 30 MIN PRN
Status: DISCONTINUED | OUTPATIENT
Start: 2017-05-09 | End: 2017-05-09 | Stop reason: HOSPADM

## 2017-05-09 RX ORDER — BUPIVACAINE HYDROCHLORIDE AND EPINEPHRINE 2.5; 5 MG/ML; UG/ML
INJECTION, SOLUTION INFILTRATION; PERINEURAL PRN
Status: DISCONTINUED | OUTPATIENT
Start: 2017-05-09 | End: 2017-05-09

## 2017-05-09 RX ORDER — LIDOCAINE 40 MG/G
CREAM TOPICAL
Status: DISCONTINUED | OUTPATIENT
Start: 2017-05-09 | End: 2017-05-11 | Stop reason: HOSPADM

## 2017-05-09 RX ORDER — FLUMAZENIL 0.1 MG/ML
0.2 INJECTION, SOLUTION INTRAVENOUS
Status: DISCONTINUED | OUTPATIENT
Start: 2017-05-09 | End: 2017-05-09 | Stop reason: HOSPADM

## 2017-05-09 RX ORDER — LIDOCAINE 40 MG/G
CREAM TOPICAL
Status: DISCONTINUED | OUTPATIENT
Start: 2017-05-09 | End: 2017-05-09 | Stop reason: HOSPADM

## 2017-05-09 RX ADMIN — FENTANYL CITRATE 25 MCG: 50 INJECTION, SOLUTION INTRAMUSCULAR; INTRAVENOUS at 14:51

## 2017-05-09 RX ADMIN — BUPIVACAINE 20 ML: 13.3 INJECTION, SUSPENSION, LIPOSOMAL INFILTRATION at 07:55

## 2017-05-09 RX ADMIN — MIDAZOLAM 2 MG: 1 INJECTION INTRAMUSCULAR; INTRAVENOUS at 08:51

## 2017-05-09 RX ADMIN — ROCURONIUM BROMIDE 30 MG: 10 INJECTION INTRAVENOUS at 09:24

## 2017-05-09 RX ADMIN — ROCURONIUM BROMIDE 20 MG: 10 INJECTION INTRAVENOUS at 11:18

## 2017-05-09 RX ADMIN — HYDROMORPHONE HYDROCHLORIDE: 10 INJECTION, SOLUTION INTRAMUSCULAR; INTRAVENOUS; SUBCUTANEOUS at 22:26

## 2017-05-09 RX ADMIN — HYDROMORPHONE HYDROCHLORIDE: 10 INJECTION, SOLUTION INTRAMUSCULAR; INTRAVENOUS; SUBCUTANEOUS at 15:41

## 2017-05-09 RX ADMIN — FENTANYL CITRATE 50 MCG: 50 INJECTION, SOLUTION INTRAMUSCULAR; INTRAVENOUS at 13:00

## 2017-05-09 RX ADMIN — Medication 5 MG: at 09:57

## 2017-05-09 RX ADMIN — LIDOCAINE HYDROCHLORIDE 100 MG: 20 INJECTION, SOLUTION INFILTRATION; PERINEURAL at 08:55

## 2017-05-09 RX ADMIN — ROCURONIUM BROMIDE 20 MG: 10 INJECTION INTRAVENOUS at 09:37

## 2017-05-09 RX ADMIN — PHENYLEPHRINE HYDROCHLORIDE 50 MCG: 10 INJECTION, SOLUTION INTRAMUSCULAR; INTRAVENOUS; SUBCUTANEOUS at 10:19

## 2017-05-09 RX ADMIN — FENTANYL CITRATE 50 MCG: 50 INJECTION, SOLUTION INTRAMUSCULAR; INTRAVENOUS at 11:47

## 2017-05-09 RX ADMIN — HYDROMORPHONE HYDROCHLORIDE 0.3 MG: 1 INJECTION, SOLUTION INTRAMUSCULAR; INTRAVENOUS; SUBCUTANEOUS at 15:11

## 2017-05-09 RX ADMIN — SUGAMMADEX 190 MG: 100 INJECTION, SOLUTION INTRAVENOUS at 13:56

## 2017-05-09 RX ADMIN — ROCURONIUM BROMIDE 20 MG: 10 INJECTION INTRAVENOUS at 12:57

## 2017-05-09 RX ADMIN — BUPIVACAINE HYDROCHLORIDE AND EPINEPHRINE BITARTRATE 20 ML: 2.5; .005 INJECTION, SOLUTION INFILTRATION; PERINEURAL at 07:55

## 2017-05-09 RX ADMIN — ROCURONIUM BROMIDE 20 MG: 10 INJECTION INTRAVENOUS at 10:42

## 2017-05-09 RX ADMIN — Medication 10 MG: at 09:58

## 2017-05-09 RX ADMIN — CEFAZOLIN SODIUM 2 G: 2 INJECTION, SOLUTION INTRAVENOUS at 09:14

## 2017-05-09 RX ADMIN — ACETAMINOPHEN 1000 MG: 10 INJECTION, SOLUTION INTRAVENOUS at 17:26

## 2017-05-09 RX ADMIN — FENTANYL CITRATE 100 MCG: 50 INJECTION, SOLUTION INTRAMUSCULAR; INTRAVENOUS at 08:55

## 2017-05-09 RX ADMIN — PHENYLEPHRINE HYDROCHLORIDE 100 MCG: 10 INJECTION, SOLUTION INTRAMUSCULAR; INTRAVENOUS; SUBCUTANEOUS at 10:04

## 2017-05-09 RX ADMIN — PHENYLEPHRINE HYDROCHLORIDE 100 MCG: 10 INJECTION, SOLUTION INTRAMUSCULAR; INTRAVENOUS; SUBCUTANEOUS at 10:21

## 2017-05-09 RX ADMIN — PHENYLEPHRINE HYDROCHLORIDE 0.5 MCG/KG/MIN: 10 INJECTION, SOLUTION INTRAMUSCULAR; INTRAVENOUS; SUBCUTANEOUS at 10:17

## 2017-05-09 RX ADMIN — FENTANYL CITRATE 50 MCG: 50 INJECTION, SOLUTION INTRAMUSCULAR; INTRAVENOUS at 15:01

## 2017-05-09 RX ADMIN — ONDANSETRON 4 MG: 2 INJECTION INTRAMUSCULAR; INTRAVENOUS at 17:26

## 2017-05-09 RX ADMIN — FENTANYL CITRATE 50 MCG: 50 INJECTION, SOLUTION INTRAMUSCULAR; INTRAVENOUS at 09:51

## 2017-05-09 RX ADMIN — FENTANYL CITRATE 50 MCG: 50 INJECTION, SOLUTION INTRAMUSCULAR; INTRAVENOUS at 14:17

## 2017-05-09 RX ADMIN — GLYCOPYRROLATE 0.2 MG: 0.2 INJECTION, SOLUTION INTRAMUSCULAR; INTRAVENOUS at 09:52

## 2017-05-09 RX ADMIN — HYDROMORPHONE HYDROCHLORIDE 0.5 MG: 1 INJECTION, SOLUTION INTRAMUSCULAR; INTRAVENOUS; SUBCUTANEOUS at 13:10

## 2017-05-09 RX ADMIN — HYDROMORPHONE HYDROCHLORIDE: 10 INJECTION, SOLUTION INTRAMUSCULAR; INTRAVENOUS; SUBCUTANEOUS at 17:32

## 2017-05-09 RX ADMIN — PHENYLEPHRINE HYDROCHLORIDE 100 MCG: 10 INJECTION, SOLUTION INTRAMUSCULAR; INTRAVENOUS; SUBCUTANEOUS at 09:42

## 2017-05-09 RX ADMIN — GLYCOPYRROLATE 0.2 MG: 0.2 INJECTION, SOLUTION INTRAMUSCULAR; INTRAVENOUS at 09:56

## 2017-05-09 RX ADMIN — FENTANYL CITRATE 50 MCG: 50 INJECTION, SOLUTION INTRAMUSCULAR; INTRAVENOUS at 07:48

## 2017-05-09 RX ADMIN — FENTANYL CITRATE 25 MCG: 50 INJECTION, SOLUTION INTRAMUSCULAR; INTRAVENOUS at 14:02

## 2017-05-09 RX ADMIN — SODIUM CHLORIDE, POTASSIUM CHLORIDE, SODIUM LACTATE AND CALCIUM CHLORIDE: 600; 310; 30; 20 INJECTION, SOLUTION INTRAVENOUS at 08:49

## 2017-05-09 RX ADMIN — CEFAZOLIN SODIUM 1 G: 2 INJECTION, SOLUTION INTRAVENOUS at 13:08

## 2017-05-09 RX ADMIN — DEXAMETHASONE SODIUM PHOSPHATE 4 MG: 4 INJECTION, SOLUTION INTRA-ARTICULAR; INTRALESIONAL; INTRAMUSCULAR; INTRAVENOUS; SOFT TISSUE at 13:05

## 2017-05-09 RX ADMIN — FENTANYL CITRATE 50 MCG: 50 INJECTION, SOLUTION INTRAMUSCULAR; INTRAVENOUS at 11:20

## 2017-05-09 RX ADMIN — ALBUMIN (HUMAN): 12.5 SOLUTION INTRAVENOUS at 09:44

## 2017-05-09 RX ADMIN — ROCURONIUM BROMIDE 20 MG: 10 INJECTION INTRAVENOUS at 11:41

## 2017-05-09 RX ADMIN — CEFAZOLIN 1 G: 1 INJECTION, POWDER, FOR SOLUTION INTRAMUSCULAR; INTRAVENOUS at 11:14

## 2017-05-09 RX ADMIN — PHENYLEPHRINE HYDROCHLORIDE 100 MCG: 10 INJECTION, SOLUTION INTRAMUSCULAR; INTRAVENOUS; SUBCUTANEOUS at 09:29

## 2017-05-09 RX ADMIN — PHENYLEPHRINE HYDROCHLORIDE 100 MCG: 10 INJECTION, SOLUTION INTRAMUSCULAR; INTRAVENOUS; SUBCUTANEOUS at 09:23

## 2017-05-09 RX ADMIN — ROCURONIUM BROMIDE 90 MG: 10 INJECTION INTRAVENOUS at 08:55

## 2017-05-09 RX ADMIN — ROCURONIUM BROMIDE 20 MG: 10 INJECTION INTRAVENOUS at 10:09

## 2017-05-09 RX ADMIN — PHENYLEPHRINE HYDROCHLORIDE 100 MCG: 10 INJECTION, SOLUTION INTRAMUSCULAR; INTRAVENOUS; SUBCUTANEOUS at 09:54

## 2017-05-09 RX ADMIN — PHENYLEPHRINE HYDROCHLORIDE 100 MCG: 10 INJECTION, SOLUTION INTRAMUSCULAR; INTRAVENOUS; SUBCUTANEOUS at 12:17

## 2017-05-09 RX ADMIN — SODIUM CHLORIDE, POTASSIUM CHLORIDE, SODIUM LACTATE AND CALCIUM CHLORIDE: 600; 310; 30; 20 INJECTION, SOLUTION INTRAVENOUS at 22:26

## 2017-05-09 RX ADMIN — MIDAZOLAM 2 MG: 1 INJECTION INTRAMUSCULAR; INTRAVENOUS at 07:48

## 2017-05-09 RX ADMIN — PROPOFOL 270 MG: 10 INJECTION, EMULSION INTRAVENOUS at 08:55

## 2017-05-09 RX ADMIN — SODIUM CHLORIDE, POTASSIUM CHLORIDE, SODIUM LACTATE AND CALCIUM CHLORIDE: 600; 310; 30; 20 INJECTION, SOLUTION INTRAVENOUS at 16:39

## 2017-05-09 RX ADMIN — PHENYLEPHRINE HYDROCHLORIDE 50 MCG: 10 INJECTION, SOLUTION INTRAMUSCULAR; INTRAVENOUS; SUBCUTANEOUS at 09:35

## 2017-05-09 RX ADMIN — FENTANYL CITRATE 25 MCG: 50 INJECTION, SOLUTION INTRAMUSCULAR; INTRAVENOUS at 15:23

## 2017-05-09 RX ADMIN — FENTANYL CITRATE 50 MCG: 50 INJECTION, SOLUTION INTRAMUSCULAR; INTRAVENOUS at 15:43

## 2017-05-09 RX ADMIN — ROCURONIUM BROMIDE 20 MG: 10 INJECTION INTRAVENOUS at 12:24

## 2017-05-09 ASSESSMENT — PAIN DESCRIPTION - DESCRIPTORS: DESCRIPTORS: PRESSURE

## 2017-05-09 NOTE — IP AVS SNAPSHOT
[address] [address]    Phone:  [phone]                                       After Visit Summary   5/9/2017    Rex Jensen    MRN: [id number]           After Visit Summary Signature Page     I have received my discharge instructions, and my questions have been answered. I have discussed any challenges I see with this plan with the nurse or doctor.    ..........................................................................................................................................  Patient/Patient Representative Signature      ..........................................................................................................................................  Patient Representative Print Name and Relationship to Patient    ..................................................               ................................................  Date                                            Time    ..........................................................................................................................................  Reviewed by Signature/Title    ...................................................              ..............................................  Date                                                            Time

## 2017-05-09 NOTE — ANESTHESIA PROCEDURE NOTES
Peripheral Nerve Block Procedure Note    Staff:     Anesthesiologist:  MATI AMAYA    Resident/CRNA:  ILENE PARMAR    Block performed by resident/CRNA in the presence of a teaching physician      Referred By:  PAULETTE RAMIREZ  Location: Pre-op  Procedure Start/Stop TImes:      5/9/2017 7:49 AM     5/9/2017 7:58 AM    patient identified, IV checked, site marked, risks and benefits discussed, informed consent, monitors and equipment checked, pre-op evaluation, at physician/surgeon's request and post-op pain management      Correct Patient: Yes      Correct Position: Yes      Correct Site: Yes      Correct Procedure: Yes      Correct Laterality:  Yes    Site Marked:  Yes  Procedure details:     Procedure:  TAP (Subcostal)    Laterality:  Bilateral    Position:  Supine    Sterile Prep: chloraprep, patient draped, mask and sterile gloves      Local skin infiltration:  1% lidocaine    amount (mL):  3    Needle:  Short bevel and insulated    Needle gauge:  21    Needle length (mm):  110    Ultrasound: Yes      Ultrasound used to identify targeted nerve, plexus, or vascular structure and placed a needle adjacent to it      Permanent Image entered into patiient's record      Abnormal pain on injection: No      Blood Aspirated: No      Paresthesias:  No    Bleeding at site: No      Bolus via:  Needle    Infusion Method:  Single Shot    Complications:  None

## 2017-05-09 NOTE — ANESTHESIA CARE TRANSFER NOTE
Patient: Rex Jensen    Procedure(s):  Resection Retroperitoneal Mass   Anesthesia general with Block    - Wound Class: I-Clean    Diagnosis: Retroperitoneal Mass   Diagnosis Additional Information: No value filed.    Anesthesia Type:   General, ETT     Note:  Airway :Face Mask  Patient transferred to:PACU  Comments: Stable      Vitals: (Last set prior to Anesthesia Care Transfer)    CRNA VITALS  5/9/2017 1343 - 5/9/2017 1424      5/9/2017             Pulse: 103    ART BP: (!)  163/32    ART Mean: 85    SpO2: (!)  89 %                Electronically Signed By: Glenda Moon MD  May 9, 2017  2:24 PM

## 2017-05-09 NOTE — IP AVS SNAPSHOT
MRN:[id number]                      After Visit Summary   5/9/2017    Rex Jensen    MRN: [id number]           Thank you!     Thank you for choosing [facility] for your care. Our goal is always to provide you with excellent care. Hearing back from our patients is one way we can continue to improve our services. Please take a few minutes to complete the written survey that you may receive in the mail after you visit with us. Thank you!        Patient Information     Date Of Birth          1960        Designated Caregiver       Most Recent Value    Caregiver    Will someone help with your care after discharge? no      About your hospital stay     You were admitted on:  May 9, 2017 You last received care in the:  [address] [facility] [facility]    You were discharged on:  May 11, 2017        Reason for your hospital stay       Resection of retroperitoneal sarcoma.                  Who to Call     For medical emergencies, please call 911.  For non-urgent questions about your medical care, please call your primary care provider or clinic, [phone]  For questions related to your surgery, please call your surgery clinic        Attending Provider     Provider Specialty    Alvin Bryant MD General Surgery       Primary Care Provider Office Phone # Fax #    Js Magdaleno PA-C [phone]       [facility] [place] [address]  [place] [address]        After Care Instructions     Diet       Follow this diet upon discharge: Regular            Discharge Instructions       If your travel plans upon discharge include prolonged periods of sitting (a lengthy car or plane ride), it is highly beneficial to get up and walk at least once per hour to help prevent swelling and blood clots.     You may shower after operation, let warm soapy water run over incision and pat dry. Do not submerge, soak, or scrub incision.    Take incentive spirometer home for continued frequent use    You will be  "discharged with narcotic pain medications. Please take them only as needed and do not operate a car or heavy machinery for 24 hours after taking them.  Narcotic pain medications like oxycodone are constipating. Please ensure that you are drinking adequate amounts of fluids and taking stool softeners while you are taking narcotics. Take Miralax as needed for constipation.     Do not drive until you can with stand pressing the brake pedal quickly and fully without pain and you are not distracted by pain.     No heavy lifting greater than 10 pounds for 6 weeks    Call clinic [phone] for fever greater than 101.5, chills, red skin around incision, drainage from incision, increased swelling from the incision, bleeding from the incision that is not controlled with light pressure, inability to tolerate diet,new nausea/vomiting or other new/worsening symptoms.    For after hours questions or concerns you can contact the on-call surgical oncology resident (nights and weekends [phone] and ask \"I would like to page the Surgical Oncology Resident on call.\"). In emergencies, call [phone]    Follow Up:  -Follow up in clinic with Dr Bryant in 2 weeks. You should be called to make an appointment within 3 business days. If you are not contacted, call [phone] to make an appointment.                  Follow-up Appointments     Adult [facility] Follow-up and recommended labs and tests       Follow up in 2 weeks with Dr. Bryant.     Appointments on [place] and/or [facility] (with [facility] or [facility] provider or service). Call [phone] if you haven't heard regarding these appointments within 7 days of discharge.                  Pending Results     Date and Time Order Name Status Description    5/9/2017 1135 Surgical pathology exam Preliminary             Statement of Approval     Ordered          05/11/17 3860  I have reviewed and agree with all the recommendations and orders detailed in this document.  EFFECTIVE NOW   " "  Approved and electronically signed by:  Kin Koenig MD             Admission Information     Date & Time Provider Department Dept. Phone    5/9/2017 Alvin Bryant MD [address] [facility] [phone]      Your Vitals Were     Blood Pressure Pulse Temperature Respirations Height Weight    132/68 (BP Location: Left arm) 93 97  F (36.1  C) (Oral) 18 1.854 m (6' 1\") 91.2 kg (201 lb)    Pulse Oximetry BMI (Body Mass Index)                97% 26.52 kg/m2          MyChart Information     [employer] gives you secure access to your electronic health record. If you see a primary care provider, you can also send messages to your care team and make appointments. If you have questions, please call your primary care clinic.  If you do not have a primary care provider, please call [phone] and they will assist you.        Care EveryWhere ID     This is your Care EveryWhere ID. This could be used by other organizations to access your [facility] medical records  [id number]           Review of your medicines      START taking        Dose / Directions    acetaminophen 325 MG tablet   Commonly known as:  TYLENOL   Used for:  Postoperative pain        Dose:  650 mg   Take 2 tablets (650 mg) by mouth every 6 hours as needed for mild pain   Quantity:  112 tablet   Refills:  0       enoxaparin 40 MG/0.4ML injection   Commonly known as:  LOVENOX   Used for:  Deep vein thrombosis (DVT) prophylaxis prescribed at discharge        Dose:  40 mg   Inject 0.4 mLs (40 mg) Subcutaneous every 24 hours for 28 days   Quantity:  11.2 mL   Refills:  0       oxyCODONE 5 MG IR tablet   Commonly known as:  ROXICODONE   Used for:  Postoperative pain        Dose:  5 mg   Take 1 tablet (5 mg) by mouth every 4 hours as needed for moderate to severe pain   Quantity:  30 tablet   Refills:  0       senna-docusate 8.6-50 MG per tablet   Commonly known as:  DHRUV-COLACE   Used for:  Postoperative pain        Dose:  1 tablet   Take 1 tablet by mouth 2 " times daily as needed for constipation   Quantity:  30 tablet   Refills:  1            Where to get your medicines      These medications were sent to [facility] - [place], [place] - [address]  [address], [facility] [address]     Phone:  [phone]     enoxaparin 40 MG/0.4ML injection    senna-docusate 8.6-50 MG per tablet         Some of these will need a paper prescription and others can be bought over the counter. Ask your nurse if you have questions.     Bring a paper prescription for each of these medications     oxyCODONE 5 MG IR tablet                Protect others around you: Learn how to safely use, store and throw away your medicines at www.disposemymeds.org.             Medication List: This is a list of all your medications and when to take them. Check marks below indicate your daily home schedule. Keep this list as a reference.      Medications           Morning Afternoon Evening Bedtime As Needed    acetaminophen 325 MG tablet   Commonly known as:  TYLENOL   Take 2 tablets (650 mg) by mouth every 6 hours as needed for mild pain   Last time this was given:  650 mg on 5/11/2017  4:35 PM                                enoxaparin 40 MG/0.4ML injection   Commonly known as:  LOVENOX   Inject 0.4 mLs (40 mg) Subcutaneous every 24 hours for 28 days   Last time this was given:  40 mg on 5/11/2017  2:56 PM                                oxyCODONE 5 MG IR tablet   Commonly known as:  ROXICODONE   Take 1 tablet (5 mg) by mouth every 4 hours as needed for moderate to severe pain   Last time this was given:  5 mg on 5/11/2017  1:05 PM                                senna-docusate 8.6-50 MG per tablet   Commonly known as:  HDRUV-COLACE   Take 1 tablet by mouth 2 times daily as needed for constipation

## 2017-05-09 NOTE — ANESTHESIA POSTPROCEDURE EVALUATION
Patient: Rex Jensen    Procedure(s):  Resection Retroperitoneal Mass   Anesthesia general with Block    - Wound Class: I-Clean    Diagnosis:Retroperitoneal Mass   Diagnosis Additional Information: No value filed.    Anesthesia Type:  General, ETT    Note:  Anesthesia Post Evaluation    Patient location during evaluation: PACU  Patient participation: Able to fully participate in evaluation  Level of consciousness: awake and alert  Pain management: adequate  Airway patency: patent  Cardiovascular status: acceptable  Respiratory status: acceptable  Hydration status: acceptable  PONV: none     Anesthetic complications: None          Last vitals:  Vitals:    05/09/17 1430 05/09/17 1445 05/09/17 1500   BP: 112/73 107/70    Resp: 16 13 13   Temp:  36.8  C (98.3  F)    SpO2: 98%           Electronically Signed By: Jaquan Muñiz MD  May 9, 2017  3:04 PM

## 2017-05-09 NOTE — ANESTHESIA PROCEDURE NOTES
Arterial Line Procedure Note  Staff:     Anesthesiologist:  PEREZ DUPREE    Resident/CRNA:  AIDAN RUTLEDGE    Arterial line performed by resident/CRNA in presence of a teaching physician    Location: In OR After Induction  Procedure Start/Stop Times:     patient identified, IV checked, site marked, risks and benefits discussed, informed consent, monitors and equipment checked, pre-op evaluation and at physician/surgeon's request      Correct Patient: Yes      Correct Position: Yes      Correct Site: Yes      Correct Procedure: Yes      Correct Laterality:  Yes    Site Marked:  Yes  Line Placement:     Procedure:  Arterial Line    Insertion Site:  Radial    Insertion laterality:  Left    Skin Prep: Chloraprep      Patient Prep: patient draped, mask, sterile gloves, hat and hand hygiene      Local skin infiltration:  None    Ultrasound Guided?: No      Catheter size:  20 gauge, Quick cath    Cath secured with: suture      Dressing:  Tegaderm    Complications:  None obvious    Arterial waveform: Yes      IBP within 10% of NIBP: Yes

## 2017-05-09 NOTE — BRIEF OP NOTE
[facility], [place]    Brief Operative Note    Pre-operative diagnosis: Retroperitoneal Sarcoma  Post-operative diagnosis Same  Procedure: Procedure(s):  Resection Retroperitoneal Mass   Anesthesia general with Block    - Wound Class: I-Clean  Surgeon: Surgeon(s) and Role:     * Alvin Bryant MD - Primary     * Kin Craig MD - Resident - Assisting  Anesthesia: Combined General with Block   Estimated blood loss: 350 mL  UOP: 245 mL  Fluids 250 mL albumin, 1.7 L crystalloid  Drains: None  Specimens:   ID Type Source Tests Collected by Time Destination   A : Peritoneal biopsy Tissue Peritoneum SURGICAL PATHOLOGY EXAM Alvin Bryant MD 5/9/2017 11:35 AM    B : Retroperitoneal Sarcoma Tissue Peritoneum SURGICAL PATHOLOGY EXAM Alvin Bryant MD 5/9/2017 12:59 PM    C : NEW SUPERIOR MARGIN Tissue Peritoneum SURGICAL PATHOLOGY EXAM Alvin Bryant MD 5/9/2017  1:01 PM      Complications: None immediately

## 2017-05-09 NOTE — PROGRESS NOTES
SPIRITUAL HEALTH SERVICES  [facility]   PRE-SURGERY VISIT    Had pre-surgery visit with pt.  Provided spiritual support, prayer.   Joseph Burgos M.Div (Bill)., [facility]  Staff   Pager [phone]

## 2017-05-09 NOTE — PROGRESS NOTES
TAP block procedure completed.  Patient alert, responding appropriately.  Spouse in room with Uncle Gopal.

## 2017-05-10 LAB
ANION GAP SERPL CALCULATED.3IONS-SCNC: 7 MMOL/L (ref 3–14)
BUN SERPL-MCNC: 18 MG/DL (ref 7–30)
CALCIUM SERPL-MCNC: 8.6 MG/DL (ref 8.5–10.1)
CHLORIDE SERPL-SCNC: 106 MMOL/L (ref 94–109)
CO2 SERPL-SCNC: 27 MMOL/L (ref 20–32)
COPATH REPORT: NORMAL
CREAT SERPL-MCNC: 1.05 MG/DL (ref 0.66–1.25)
ERYTHROCYTE [DISTWIDTH] IN BLOOD BY AUTOMATED COUNT: 14.2 % (ref 10–15)
GFR SERPL CREATININE-BSD FRML MDRD: 73 ML/MIN/1.7M2
GLUCOSE SERPL-MCNC: 103 MG/DL (ref 70–99)
HCT VFR BLD AUTO: 36 % (ref 40–53)
HGB BLD-MCNC: 11.3 G/DL (ref 13.3–17.7)
MAGNESIUM SERPL-MCNC: 2.1 MG/DL (ref 1.6–2.3)
MCH RBC QN AUTO: 25.8 PG (ref 26.5–33)
MCHC RBC AUTO-ENTMCNC: 31.4 G/DL (ref 31.5–36.5)
MCV RBC AUTO: 82 FL (ref 78–100)
PLATELET # BLD AUTO: 187 10E9/L (ref 150–450)
POTASSIUM SERPL-SCNC: 4.1 MMOL/L (ref 3.4–5.3)
RBC # BLD AUTO: 4.38 10E12/L (ref 4.4–5.9)
SODIUM SERPL-SCNC: 140 MMOL/L (ref 133–144)
WBC # BLD AUTO: 7.4 10E9/L (ref 4–11)

## 2017-05-10 PROCEDURE — 85027 COMPLETE CBC AUTOMATED: CPT | Performed by: SURGERY

## 2017-05-10 PROCEDURE — 25800025 ZZH RX 258: Performed by: SURGERY

## 2017-05-10 PROCEDURE — 25000132 ZZH RX MED GY IP 250 OP 250 PS 637: Performed by: SURGERY

## 2017-05-10 PROCEDURE — 25000132 ZZH RX MED GY IP 250 OP 250 PS 637: Performed by: STUDENT IN AN ORGANIZED HEALTH CARE EDUCATION/TRAINING PROGRAM

## 2017-05-10 PROCEDURE — 36415 COLL VENOUS BLD VENIPUNCTURE: CPT | Performed by: SURGERY

## 2017-05-10 PROCEDURE — 25000128 H RX IP 250 OP 636: Performed by: SURGERY

## 2017-05-10 PROCEDURE — 80048 BASIC METABOLIC PNL TOTAL CA: CPT | Performed by: SURGERY

## 2017-05-10 PROCEDURE — 25000125 ZZHC RX 250: Performed by: SURGERY

## 2017-05-10 PROCEDURE — 83735 ASSAY OF MAGNESIUM: CPT | Performed by: SURGERY

## 2017-05-10 PROCEDURE — 12000008 ZZH R&B INTERMEDIATE UMMC

## 2017-05-10 PROCEDURE — 99231 SBSQ HOSP IP/OBS SF/LOW 25: CPT | Performed by: NURSE PRACTITIONER

## 2017-05-10 RX ORDER — KETOROLAC TROMETHAMINE 30 MG/ML
15 INJECTION, SOLUTION INTRAMUSCULAR; INTRAVENOUS EVERY 6 HOURS
Status: DISCONTINUED | OUTPATIENT
Start: 2017-05-10 | End: 2017-05-11 | Stop reason: HOSPADM

## 2017-05-10 RX ORDER — ALBUTEROL SULFATE 90 UG/1
2 AEROSOL, METERED RESPIRATORY (INHALATION) 4 TIMES DAILY PRN
Status: DISCONTINUED | OUTPATIENT
Start: 2017-05-10 | End: 2017-05-11 | Stop reason: HOSPADM

## 2017-05-10 RX ORDER — ACETAMINOPHEN 10 MG/ML
1000 INJECTION, SOLUTION INTRAVENOUS 3 TIMES DAILY
Status: DISCONTINUED | OUTPATIENT
Start: 2017-05-10 | End: 2017-05-11

## 2017-05-10 RX ORDER — ALBUTEROL SULFATE 90 UG/1
2 AEROSOL, METERED RESPIRATORY (INHALATION) 4 TIMES DAILY
Status: DISCONTINUED | OUTPATIENT
Start: 2017-05-10 | End: 2017-05-10

## 2017-05-10 RX ORDER — ACETAMINOPHEN 10 MG/ML
1000 INJECTION, SOLUTION INTRAVENOUS 3 TIMES DAILY
Status: DISCONTINUED | OUTPATIENT
Start: 2017-05-10 | End: 2017-05-10

## 2017-05-10 RX ADMIN — SIMETHICONE 40 MG: 20 SUSPENSION/ DROPS ORAL at 00:19

## 2017-05-10 RX ADMIN — SODIUM CHLORIDE, POTASSIUM CHLORIDE, SODIUM LACTATE AND CALCIUM CHLORIDE: 600; 310; 30; 20 INJECTION, SOLUTION INTRAVENOUS at 14:25

## 2017-05-10 RX ADMIN — FENTANYL CITRATE: 50 INJECTION, SOLUTION INTRAMUSCULAR; INTRAVENOUS at 17:35

## 2017-05-10 RX ADMIN — ALBUTEROL SULFATE 2 PUFF: 90 AEROSOL, METERED RESPIRATORY (INHALATION) at 18:53

## 2017-05-10 RX ADMIN — ACETAMINOPHEN 1000 MG: 10 INJECTION, SOLUTION INTRAVENOUS at 01:12

## 2017-05-10 RX ADMIN — SIMETHICONE 40 MG: 20 SUSPENSION/ DROPS ORAL at 07:49

## 2017-05-10 RX ADMIN — SIMETHICONE 40 MG: 20 SUSPENSION/ DROPS ORAL at 16:41

## 2017-05-10 RX ADMIN — KETOROLAC TROMETHAMINE 15 MG: 30 INJECTION, SOLUTION INTRAMUSCULAR at 19:13

## 2017-05-10 RX ADMIN — SODIUM CHLORIDE, POTASSIUM CHLORIDE, SODIUM LACTATE AND CALCIUM CHLORIDE: 600; 310; 30; 20 INJECTION, SOLUTION INTRAVENOUS at 22:57

## 2017-05-10 RX ADMIN — ENOXAPARIN SODIUM 40 MG: 40 INJECTION SUBCUTANEOUS at 17:41

## 2017-05-10 RX ADMIN — ACETAMINOPHEN 1000 MG: 10 INJECTION, SOLUTION INTRAVENOUS at 16:51

## 2017-05-10 RX ADMIN — SODIUM CHLORIDE, POTASSIUM CHLORIDE, SODIUM LACTATE AND CALCIUM CHLORIDE: 600; 310; 30; 20 INJECTION, SOLUTION INTRAVENOUS at 06:48

## 2017-05-10 RX ADMIN — ACETAMINOPHEN 1000 MG: 10 INJECTION, SOLUTION INTRAVENOUS at 09:21

## 2017-05-10 ASSESSMENT — PAIN DESCRIPTION - DESCRIPTORS
DESCRIPTORS: PRESSURE
DESCRIPTORS: PRESSURE
DESCRIPTORS: BURNING
DESCRIPTORS: ACHING

## 2017-05-10 NOTE — PROVIDER NOTIFICATION
Notified MD at 0929  regarding change in condition and changes in vital signs.  New difficulty breathing and increased 02 demands.      -Albuterol inhaler ordered  -Pt concerned Dilaudid allergy present   -PCA button set aside  -Symptoms passed w/o use of inhaler  -VS stable  -MD updated, RN will continue to monitor and assess

## 2017-05-10 NOTE — PROGRESS NOTES
Surgical Oncology Progress Note    Interval History:  Gas pains last night which improved with medication. Ambulated in the hallway. Denies nausea. In good spirits    Physical Exam:   Temp:  [98.3  F (36.8  C)-99.9  F (37.7  C)] 98.4  F (36.9  C)  Pulse:  [62-92] 62  Heart Rate:  [] 87  Resp:  [11-23] 14  BP: (100-128)/(55-83) 117/70  MAP:  [74 mmHg-85 mmHg] 76 mmHg  Arterial Line BP: (107-125)/(55-65) 107/58  SpO2:  [93 %-100 %] 97 %  General: Alert, oriented, appears comfortable, NAD.  Respiratory: breathing non labored  Abdomen: Abdomen is soft, appropriately tender, non-distended. Incision is clean, dry and intact.     Data:   All laboratory and imaging data in the past 24 hours reviewed  I/O last 3 completed shifts:  In: 2741.67 [I.V.:2491.67]  Out: 1570 [Urine:1220; Blood:350]  Recent Labs   Lab Test  05/09/17   1008  05/09/17   0658  04/20/17   1433  09/16/16   1118  03/17/15   1548   WBC   --    --   7.8  6.6  8.7   HGB  11.6*  13.6  14.2  15.4  15.5   PLT   --    --   249  178  173   INR   --    --   1.03   --    --       Recent Labs   Lab Test  05/09/17   1008  04/20/17   1433  10/05/16   1108  09/16/16   1118  03/17/15   1548   NA  137  139   --   138  139   POTASSIUM  3.9  4.7  4.8  4.3  4.8   CHLORIDE   --   104   --   103  104   CO2   --   29   --   26  30   BUN   --   16   --   23  23   CR   --   1.13   --   1.11  1.13   ANIONGAP   --   6   --   9  5   LAMAR   --   9.7   --   9.2  9.3   GLC  128*  92   --   83  116*        Recent Labs   Lab Test  09/16/16   1118   PROTTOTAL  7.4   ALBUMIN  4.2   BILITOTAL  1.7*   ALKPHOS  84   AST  23   ALT  49       Assessment and Plan: 57 year old man who underwent resection of retroperitoneal sarcoma on 5/9/2017. Doing well post-operatively. Will remove sigala cathter today, start prophylactic anticoagulation, advance to clear liquid diet. Continue PCA and encourage ambulation.    Jann Davis MD   Surgical Oncology

## 2017-05-10 NOTE — PLAN OF CARE
Problem: Goal Outcome Summary  Goal: Goal Outcome Summary  Outcome: Improving  POD 1 . Tmax 99.2, otherwise VSS. Capnography monitoring in place, values WNL. Pt reported intolerable gas pain at the start of the night. Given simethicone, heating packs and repositioned with improvement. Pt is otherwise on a Dilaudid PCA at 0.2 mg and IV tylenol. Pt reports improvement in surgical pain from regimen. Denies nausea, currently NPO with ice chips. No flatus or stool yet. Collier with adequate urine volumes. Midline incision is c/d/i. Up with assist of one. Ambulated in the price yesterday evening. Continue to monitor pain control, GI/ function and encourage OOB activity.

## 2017-05-10 NOTE — PLAN OF CARE
Problem: Goal Outcome Summary  Goal: Goal Outcome Summary  Outcome: Improving  VSS. Had en episode of SOB, stricture in throat and increased 02 demand. Resolved after discontinued use of PCA within an hour MD aware. Waiting for MD to dc PCA and order additional IV pain medications. On 2L NC. Clear liquids---tolerating well. Pt uses many homeopathic supplementations at home. Please have MD discuss use. Pain is being managed currently with IV tylenol. Midline incision c/d/i with some ecchymosis. Amira dc'd @ 11:00. Voided 280 @ 1330. Ambulated x 1--- slightly dizzy. Sat in chair for an hour. Continue POC.

## 2017-05-10 NOTE — OP NOTE
DATE OF OPERATION:  05/09/2017      PREOPERATIVE DIAGNOSIS:  Retroperitoneal sarcoma.      POSTOPERATIVE DIAGNOSIS:  Retroperitoneal sarcoma.      PROCEDURES:  Exploratory laparotomy and resection of retroperitoneal sarcoma.      ATTENDING SURGEON:  Alvin Bryant MD      RESIDENT SURGEON:  Kin Craig MD      ANESTHESIA:  General with endotracheal tube intubation.      INDICATIONS FOR SURGERY:  Rex Jensen is a 57-year-old man who was having abdominal pain and a mass was noted in his left lower quadrant.  A CT scan showed a roughly 12 cm mass in the left retroperitoneum.  It was involving the psoas muscle.  There was no dilatation of the left ureter, but it was certainly in that location.  He underwent a needle biopsy, which revealed a high-grade sarcoma.  The specific type was not yet determined.      PROCEDURE IN DETAIL:  After informed consent, the patient was brought to the operating room, given general anesthetic and orotracheally intubated.  He was prepped and draped in the usual fashion.  I made a midline incision with a scalpel.  The Bovie cautery was used to incise the subcutaneous tissues.  The fascia was incised and the peritoneal cavity was entered.  Self-retaining retractors were placed.  Thorough intra-abdominal examination revealed no evidence of peritoneal or liver metastases.  There was a large mass in the left lower quadrant.  We started by dissecting and by mobilizing the descending colon.  The mesentery of the descending colon was mobilized to the patient's right side.  There was no direct tumor involvement at the mesocolon or the colon itself.  We mobilized the entire descending colon and reflected to the left side.  We then identified the left ureter and dissected free from all the surrounding tissues and retracted it to the patient's right side.  We then incised the peritoneum inferiorly and inferolaterally to expose the tumor better.  The peritoneum was incised superiorly,  laterally and the left kidney was retracted superiorly.  We identified the ureter all the way up to the renal hilum.  Next, we incised a portion of the psoas muscle en bloc with the tumor.  As we came underneath the psoas muscle, we identified the left femoral nerve and protected it throughout the course of the operation.  The left femoral nerve was retracted medially as well.  The tumor was relatively adherent to the underlying muscles.  We carefully dissected the tumor off of the remaining psoas and the retroperitoneum.  Hemostasis was obtained with surgical clips and the Harmonic scalpel.  There was no direct invasion to the bone.  I felt there was no rupture of the capsule.  We did excised a thickened portion of the peritoneum inferior to the tumor and obtained frozen sections.  Did not show any evidence of malignancy.  The last attachments of the tumor were divided.  The specimen was removed completely intact.  We took a little bit more tissue.  Strict hemostasis was obtained with the Bovie cautery.  We did excise some normal-appearing fat between the kidney and the tumor to make sure this was not a well-differentiated liposarcoma.  We again confirmed that the colon, the left ureter and left femoral nerve were all intact and replaced anatomy in its normal position.  All laparotomy pads were removed.  We then closed the fascia with a running 2-0 PDS suture with small bites.  The dermis was closed with interrupted 3-0 Vicryl sutures.  The skin was closed with running 4-0 PDS subcuticular stitch.  Dermabond was placed and the patient was taken to the recovery room in stable condition.         PAULETTE RAMIREZ MD             D: 2017 14:22   T: 2017 21:10   MT:       Name:     SAGE HOLLOWAY   MRN:      [id number]        Account:        [id number]   :      1960           Procedure Date: 2017      Document: [id number]       cc: Vinod Hull MD

## 2017-05-10 NOTE — PLAN OF CARE
Problem: Goal Outcome Summary  Goal: Goal Outcome Summary  Outcome: Improving  VSS. Pt walked from cart to bed upon arrival to unit. Pain controlled with dilaudid PCA 0.2mg Q10 min and IV Tylenol. NPO +ice chips. Zofran given X1 for nausea. MIV infusing @ 125cc/hr through PIV. Midline incision dermabond NADIA no drainage. PCD's on in bed. Collier patent with adequate UOP. Cont. POC.

## 2017-05-10 NOTE — PROGRESS NOTES
"REGIONAL ANESTHESIA PAIN SERVICE  SUBJECTIVE:  Pt reports adequate pain control with nerve block injection.  Pt states earlier today he felt something similar to a reaction or response that he thinks possibly started after he began using the PCA Dilaudid button more often in preparation of getting up for a walk.  Pt reports symptoms began with dizziness, then throat tightened, felt difficult to breathe, nurse states O2 per NC increased to 4L/min to keep sats in the 90s, and no other intervention required.  PCA has been stopped and patient denies persistence of symptoms.  Denies any weakness, paresthesias, circumoral numbness, metallic taste or tinnitus.  Patient is currently without nausea or vomiting. Denies any weakness, paresthesias, circumoral numbness, metallic taste or tinnitus.  Patient is currently without nausea or vomiting.     Clinically Aligned Pain Assessment (CAPA):  Comfort (How is your pain?): Tolerable with discomfort  Change in Pain (Since your last medication/intervention?): About the same  Pain Control (How are your pain treatments working?):  Partially effective pain control      OBJECTIVE:    Blood pressure (!) 114/93, pulse 71, temperature 98.5  F (36.9  C), temperature source Oral, resp. rate 16, height 1.854 m (6' 1\"), weight 89.6 kg (197 lb 9.6 oz), SpO2 99 %.  Strength 5/5 and grossly symmetric in bilateral LE      ASSESSMENT/PLAN:    Rex Jensen is a 57 year old male POD #1 s/p  RESECT TUMOR RETROPERITONEAL with single shot bilateral transversus abdominis plane (subcostal TAP)  nerve block injections, bupivacaine 0.25% with epinephrine 1:200,000 20 mL, then liposome bupivacaine (Exparel) 1.3% 20mL given on 5/9 for postoperative analgesia.  Pt is ambulating without difficulty, no weakness or paresthesias.  Constellation of symptoms reported by patient and bedside nurse appear related to PCA Dilaudid use, and no evidence of adverse side effects associated with nerve block injections. Pt " is receiving adequate incisional pain control.  Anticipate up to 72 hours of incisional pain control.  Anticipate patient will require opioid/nonopioid analgesics for visceral and muscle pain not controlled with local anesthetic.        - NO other local anesthetic use within 96 hours of liposome bupivacaine (Exparel)  - patient received verbal and written instructions about liposome bupivacaine  - please call if questions or concerns  - discussed plan with attending anesthesiologist    [facility]  Regional Anesthesia Pain Service  5/10/2017 3:06 PM    24 hour Job Code Pager.  For in-house use only.     Dial * * [phone] and  [place]:  [phone]  [place]: [phone]  Peds:  [phone]  Enter call-back number  May text page using [employer], but NOT [employer].

## 2017-05-10 NOTE — PROGRESS NOTES
"Surgery Brief Post-Op Check  Rex eJnsen MRN: [id number]    S: Patient complains of gas pain. Says he feels like he needs to pass flatus but can not yet. Pain otherwise controlled. Some nausea, controlled by Zofran. Denies headache, chest pain, shortness of breath.    O: /68 (BP Location: Right arm)  Pulse 85  Temp 99.2  F (37.3  C) (Oral)  Resp 13  Ht 1.854 m (6' 1\")  Wt 91 kg (200 lb 9.9 oz)  SpO2 95%  BMI 26.47 kg/m2    UOP: 300 cc / last 1 hour    No acute distress, interactive  Non labored respirations on 1 LPM  Regular rate, regular rhythm, peripheral pulses 2+  Abdomen soft, appropriately tender, non distended  Incision with dermabond in place, no erythema  Collier in place, kayce urine  Extremities warm, well perfused  Alert, oriented    Post-op labs:   - AM electrolytes and CBC    A/P: 57 year old male POD#0 s/p resection of retroperitoneal mass, progressing well with gas pains.    - Continue current pain control regimen (dilaudid PCA)  - Monitor vital signs  - Wean O2 as able, encourage IS  - mIVF  - Simethicone for gas pain  - NPO + ice chips OK  - Monitor UOP  - SCDs      - - - - - - - - - - - -  Jose Singleton MD  PGY-1, General Surgery  [facility]  Pager: [phone]  Please contact primary team after 6am.  "

## 2017-05-11 VITALS
HEART RATE: 93 BPM | OXYGEN SATURATION: 97 % | TEMPERATURE: 97 F | HEIGHT: 73 IN | BODY MASS INDEX: 26.64 KG/M2 | WEIGHT: 201 LBS | RESPIRATION RATE: 18 BRPM | SYSTOLIC BLOOD PRESSURE: 132 MMHG | DIASTOLIC BLOOD PRESSURE: 68 MMHG

## 2017-05-11 LAB — GLUCOSE BLDC GLUCOMTR-MCNC: 83 MG/DL (ref 70–99)

## 2017-05-11 PROCEDURE — 25000128 H RX IP 250 OP 636: Performed by: SURGERY

## 2017-05-11 PROCEDURE — 25000132 ZZH RX MED GY IP 250 OP 250 PS 637: Performed by: STUDENT IN AN ORGANIZED HEALTH CARE EDUCATION/TRAINING PROGRAM

## 2017-05-11 PROCEDURE — 25800025 ZZH RX 258: Performed by: SURGERY

## 2017-05-11 PROCEDURE — 80048 BASIC METABOLIC PNL TOTAL CA: CPT | Performed by: SURGERY

## 2017-05-11 PROCEDURE — 00000146 ZZHCL STATISTIC GLUCOSE BY METER IP

## 2017-05-11 PROCEDURE — 25000132 ZZH RX MED GY IP 250 OP 250 PS 637: Performed by: SURGERY

## 2017-05-11 RX ORDER — OXYCODONE HYDROCHLORIDE 5 MG/1
5 TABLET ORAL EVERY 4 HOURS PRN
Status: DISCONTINUED | OUTPATIENT
Start: 2017-05-11 | End: 2017-05-11 | Stop reason: HOSPADM

## 2017-05-11 RX ORDER — ACETAMINOPHEN 325 MG/1
650 TABLET ORAL 4 TIMES DAILY
Status: DISCONTINUED | OUTPATIENT
Start: 2017-05-11 | End: 2017-05-11 | Stop reason: HOSPADM

## 2017-05-11 RX ORDER — AMOXICILLIN 250 MG
1 CAPSULE ORAL 2 TIMES DAILY PRN
Qty: 30 TABLET | Refills: 1 | Status: SHIPPED | OUTPATIENT
Start: 2017-05-11 | End: 2017-07-11

## 2017-05-11 RX ORDER — OXYCODONE HYDROCHLORIDE 5 MG/1
5 TABLET ORAL EVERY 4 HOURS PRN
Qty: 30 TABLET | Refills: 0 | Status: SHIPPED | OUTPATIENT
Start: 2017-05-11 | End: 2017-07-11

## 2017-05-11 RX ORDER — ACETAMINOPHEN 325 MG/1
650 TABLET ORAL EVERY 6 HOURS PRN
Qty: 112 TABLET | Refills: 0 | COMMUNITY
Start: 2017-05-11 | End: 2017-07-11

## 2017-05-11 RX ADMIN — SODIUM CHLORIDE, POTASSIUM CHLORIDE, SODIUM LACTATE AND CALCIUM CHLORIDE: 600; 310; 30; 20 INJECTION, SOLUTION INTRAVENOUS at 06:40

## 2017-05-11 RX ADMIN — ACETAMINOPHEN 650 MG: 325 TABLET, FILM COATED ORAL at 16:35

## 2017-05-11 RX ADMIN — KETOROLAC TROMETHAMINE 15 MG: 30 INJECTION, SOLUTION INTRAMUSCULAR at 14:55

## 2017-05-11 RX ADMIN — OXYCODONE HYDROCHLORIDE 5 MG: 5 TABLET ORAL at 17:50

## 2017-05-11 RX ADMIN — KETOROLAC TROMETHAMINE 15 MG: 30 INJECTION, SOLUTION INTRAMUSCULAR at 08:48

## 2017-05-11 RX ADMIN — ACETAMINOPHEN 650 MG: 325 TABLET, FILM COATED ORAL at 09:09

## 2017-05-11 RX ADMIN — KETOROLAC TROMETHAMINE 15 MG: 30 INJECTION, SOLUTION INTRAMUSCULAR at 02:40

## 2017-05-11 RX ADMIN — ACETAMINOPHEN 650 MG: 325 TABLET, FILM COATED ORAL at 13:05

## 2017-05-11 RX ADMIN — SIMETHICONE 40 MG: 20 SUSPENSION/ DROPS ORAL at 00:29

## 2017-05-11 RX ADMIN — OXYCODONE HYDROCHLORIDE 5 MG: 5 TABLET ORAL at 13:05

## 2017-05-11 RX ADMIN — Medication 1 MG: at 00:30

## 2017-05-11 RX ADMIN — ENOXAPARIN SODIUM 40 MG: 40 INJECTION SUBCUTANEOUS at 14:56

## 2017-05-11 ASSESSMENT — PAIN DESCRIPTION - DESCRIPTORS
DESCRIPTORS: SORE
DESCRIPTORS: SHARP
DESCRIPTORS: SHARP
DESCRIPTORS: ACHING
DESCRIPTORS: ACHING

## 2017-05-11 NOTE — PLAN OF CARE
Problem: Goal Outcome Summary  Goal: Goal Outcome Summary  Outcome: Improving  VSS. Patient had pain uncontrolled beginning of shift. MD notified. Pain now well controled c PCA Fentanyl, Tylenol IV, and Toradol. Patient c/o dizziness, SOB, very anxious first time up this shift. Patient had not been up for 5 hrs per patient. Abdominal binder applied. Patient ambulated halls c SBA x 4. Feeling much better. Large spontaneous voids. Using IS. Tolerating small amount of clear liquids. Denies nausea. Continue c POC.

## 2017-05-11 NOTE — PLAN OF CARE
"Problem: Goal Outcome Summary  Goal: Goal Outcome Summary  Outcome: Improving  Pain controlled with tylenol and toradol, and oxycodone, no s/s of reaction.  States \"I havent felt this great in 20 years\"  Good urine volumes, voids spontaneously.  Abdomen firm (is athletic), Hypo bowel sounds, +gas, no stool.  Patient ambulating independently, good distance.  Toelrating regular diet.  P:  Pain control, lovenox teaching done, monitor for return of bowel function.      "

## 2017-05-11 NOTE — PLAN OF CARE
Problem: Goal Outcome Summary  Goal: Goal Outcome Summary  Outcome: Improving  Patient complained of difficulty falling asleep.  MD notified, order for melatonin received with adequate sleep after administration.  Reports pain controlled with scheduled Tylenol and Toradol, minimal use of Fentanyl PCA overnight.  Up with stand-by assist.  Voiding adequate amounts, urinal at bedside.  Simethicone administered for gas pains with reported relief; patient reports passing a lot of flatus overnight.  Still awaiting post-operative bowel movement.  Tolerating clear liquids.  Abdominal binder in place.  Using incentive spirometer with encouragement.  Anxious at times, but asking appropriate questions about what to expect after surgery.     Monitor for full return of bowel function.  Encourage ambulation.

## 2017-05-11 NOTE — PROGRESS NOTES
Surgical Oncology Progress Note    Interval History:  No acute events overnight. Feeling better this morning. Pain controlled with tylenol and Toradol. Not using the PCA. Tolerating clear liquid diet without nausea. Passing flatus. Up ambulating.     Physical Exam:   Temp:  [97.2  F (36.2  C)-98.6  F (37  C)] 97.2  F (36.2  C)  Pulse:  [67-82] 67  Resp:  [14-18] 18  BP: (121-130)/(73-78) 122/73  SpO2:  [96 %-99 %] 96 %  General: Alert, oriented, appears comfortable, NAD.  Respiratory: breathing non labored  Abdomen: Abdomen is soft, non-tender, non-distended. Incision is clean, dry and intact. Incision is clean, dry and intact.     Data:   All laboratory and imaging data in the past 24 hours reviewed  I/O last 3 completed shifts:  In: 4185 [P.O.:1160; I.V.:3025]  Out: 3305 [Urine:3305]  Recent Labs   Lab Test  05/10/17   0809  05/09/17   1008  05/09/17   0658  04/20/17   1433  09/16/16   1118   WBC  7.4   --    --   7.8  6.6   HGB  11.3*  11.6*  13.6  14.2  15.4   PLT  187   --    --   249  178   INR   --    --    --   1.03   --       Recent Labs   Lab Test  05/10/17   0809  05/09/17   1008  04/20/17   1433   09/16/16   1118   NA  140  137  139   --   138   POTASSIUM  4.1  3.9  4.7   < >  4.3   CHLORIDE  106   --   104   --   103   CO2  27   --   29   --   26   BUN  18   --   16   --   23   CR  1.05   --   1.13   --   1.11   ANIONGAP  7   --   6   --   9   LAMAR  8.6   --   9.7   --   9.2   GLC  103*  128*  92   --   83    < > = values in this interval not displayed.     Recent Labs   Lab Test  09/16/16   1118   PROTTOTAL  7.4   ALBUMIN  4.2   BILITOTAL  1.7*   ALKPHOS  84   AST  23   ALT  49     Assessment and Plan:     Rex Jensen is a 57 year old male POd 2 s/p exploratory laparotomy and resection of retroperitoneal sarcoma.     - Advance to regular diet.   - Discontinue PCA and transition to PO oxycodone and tylenol.   - Decrease IV fluids  - Lovenox teaching  - Possible discharge to home later today if  tolerating diet.     CHERI WileyC   Surgical Oncology

## 2017-05-12 ENCOUNTER — CARE COORDINATION (OUTPATIENT)
Dept: CARE COORDINATION | Facility: CLINIC | Age: 57
End: 2017-05-12

## 2017-05-12 NOTE — PROGRESS NOTES
"[facility]  \"Hello, my name is Lori Bartholomew , and I am calling from the [facility].  I want to check in and see how you are doing, after leaving the hospital.  You may also receive a call from your Care Coordinator (care team), but I want to make sure you don t have any urgent needs.  I have a couple questions to review with you:     Post-Discharge Outreach                                                    Rex Jensen is a 57 year old male     Follow-up Appointments           Adult [facility] Follow-up and recommended labs and tests       Follow up in 2 weeks with Dr. Bryant.                 Care Team:    Patient Care Team       Relationship Specialty Notifications Start End    Js Magdaleno PA-C PCP - General Physician Assistant  4/17/17     Phone: [phone] Fax: [phone]         [facility] [place] [address] [place] [address]            Transition of Care Review                                                      Patient was called three times and no answer so post 24 hr DC follow up calls will be closed out, message was left with contact number for department seen by or following up with                    Plan                                                      Thanks for your time.  Your Care Coordinator may follow-up within the next couple days.  In the meantime if you have questions, concerns or problems call your care team.        Lori Bartholomew    "

## 2017-05-12 NOTE — DISCHARGE SUMMARY
"[facility]   Surgical Oncology Discharge Summary  Rex Jensen MRN# [id number]   Age: 57 year old YOB: 1960     Date of Admission:  5/9/2017  Date of Discharge::  5/11/2017  5:54 PM  Admitting Physician:  Alvin Bryant MD  Discharge Physician:  Alvin Bryant MD     PCP:  Js Magdaleno  Date of Admission: 5/9/2017  Date of Discharge: 5/11/2017    Admission Diagnosis:  Retroperitoneal sarcoma (H)    Discharge Diagnosis:  Retroperitoneal sarcoma (H)    Consultations:  None    Procedures:  1. Exploratory laparotomy and resection of retroperitoneal sarcoma by Dr Bryant    Brief HPI:  57 year old year old male with retroperitoneal sarcoma. After discussion of risks and potential benefits he elected to proceed with procedure as recommended.      Hospital Course:  The patient was admitted and underwent the above procedure. The patient tolerated the procedure well. The patient recovered well with no post-operative complications. Prior to discharge pain was controlled with oral pain medication and the patient was able to ambulate and void without difficulty. The patient received appropriate education post operatively. On POD #2 the patient was discharged to home.    Discharge Physical Exam:  /68 (BP Location: Left arm)  Pulse 93  Temp 97  F (36.1  C) (Oral)  Resp 18  Ht 1.854 m (6' 1\")  Wt 91.2 kg (201 lb)  SpO2 97%  BMI 26.52 kg/m2    Gen: NAD  Lungs: non-labored breathing  CV: regular rhythm, normal rate   Abd: soft, nondistended, appropriately tender, incisions are c/d/i  Ext: no peripheral edema  Neuro: AOx3    Meds:     Review of your medicines      START taking       Dose / Directions    acetaminophen 325 MG tablet   Commonly known as:  TYLENOL   Used for:  Postoperative pain        Dose:  650 mg   Take 2 tablets (650 mg) by mouth every 6 hours as needed for mild pain   Quantity:  112 tablet   Refills:  0       enoxaparin 40 MG/0.4ML injection "   Commonly known as:  LOVENOX   Used for:  Deep vein thrombosis (DVT) prophylaxis prescribed at discharge        Dose:  40 mg   Inject 0.4 mLs (40 mg) Subcutaneous every 24 hours for 28 days   Quantity:  11.2 mL   Refills:  0       oxyCODONE 5 MG IR tablet   Commonly known as:  ROXICODONE   Used for:  Postoperative pain        Dose:  5 mg   Take 1 tablet (5 mg) by mouth every 4 hours as needed for moderate to severe pain   Quantity:  30 tablet   Refills:  0       senna-docusate 8.6-50 MG per tablet   Commonly known as:  DHRUV-COLACE   Used for:  Postoperative pain        Dose:  1 tablet   Take 1 tablet by mouth 2 times daily as needed for constipation   Quantity:  30 tablet   Refills:  1            Where to get your medicines      These medications were sent to [facility] - [place], [place] - [address]  [address] [address]     Phone:  [phone]      enoxaparin 40 MG/0.4ML injection     senna-docusate 8.6-50 MG per tablet         Some of these will need a paper prescription and others can be bought over the counter. Ask your nurse if you have questions.     Bring a paper prescription for each of these medications      oxyCODONE 5 MG IR tablet             Discharge Instructions:    Reason for your hospital stay   Resection of retroperitoneal sarcoma.     Adult [facility] Follow-up and recommended labs and tests   Follow up in 2 weeks with Dr. Bryant.     Appointments on Mobile and/or [facility] (with [facility] or [facility] provider or service). Call [phone] if you haven't heard regarding these appointments within 7 days of discharge.     Discharge Instructions   If your travel plans upon discharge include prolonged periods of sitting (a lengthy car or plane ride), it is highly beneficial to get up and walk at least once per hour to help prevent swelling and blood clots.     You may shower after operation, let warm soapy water run over incision and pat dry. Do not  "submerge, soak, or scrub incision.    Take incentive spirometer home for continued frequent use    You will be discharged with narcotic pain medications. Please take them only as needed and do not operate a car or heavy machinery for 24 hours after taking them.  Narcotic pain medications like oxycodone are constipating. Please ensure that you are drinking adequate amounts of fluids and taking stool softeners while you are taking narcotics. Take Miralax as needed for constipation.     Do not drive until you can with stand pressing the brake pedal quickly and fully without pain and you are not distracted by pain.     No heavy lifting greater than 10 pounds for 6 weeks    Call clinic [phone] for fever greater than 101.5, chills, red skin around incision, drainage from incision, increased swelling from the incision, bleeding from the incision that is not controlled with light pressure, inability to tolerate diet,new nausea/vomiting or other new/worsening symptoms.   For after hours questions or concerns you can contact the on-call surgical oncology resident (nights and weekends [phone] and ask \"I would like to page the Surgical Oncology Resident on call.\"). In emergencies, call [phone]    Follow Up:  -Follow up in clinic with Dr Bryant in 2 weeks. You should be called to make an appointment within 3 business days. If you are not contacted, call [phone] to make an appointment.     Diet   Follow this diet upon discharge: Regular         "

## 2017-05-12 NOTE — PLAN OF CARE
Problem: Discharge Planning  Goal: Discharge Planning (Adult, OB, Behavioral, Peds)  Outcome: Adequate for Discharge Date Met:  05/11/17  Abdominal pain well controled c Oxycodone 5 mg q 4 hours. Patient wearing abdominal binder. Voiding adequate amounts. Tolerating diet. Denies nausea. Ambulating halls independently. Discharge instructions reviewed c patient and family. Patient has follow up numbers for questions or concerns. Discharge medications picked up in pharmacy. Patient will follow up c Dr Bryant as ordered. Patient discharged to home c family @ 1800.

## 2017-05-19 LAB — COPATH REPORT: NORMAL

## 2017-05-22 ENCOUNTER — TELEPHONE (OUTPATIENT)
Dept: ONCOLOGY | Facility: CLINIC | Age: 57
End: 2017-05-22

## 2017-05-22 NOTE — TELEPHONE ENCOUNTER
Call to pt who had resection of sarcoma on 5/9/17 with Dr Bryant. Briefly discussed pt's pathology report with him and said I would release on My Chart. Pt has post-op with Dr Bryant on 5/26. Pt said his pain is being managed with Tylenol. He does feel some bulging in his left lower abdomen when he stands up. He said his bowels are working well.

## 2017-05-25 ASSESSMENT — ENCOUNTER SYMPTOMS
WEIGHT LOSS: 0
CHILLS: 0
ABDOMINAL PAIN: 1
HEARTBURN: 0
BOWEL INCONTINENCE: 0
FATIGUE: 0
HALLUCINATIONS: 0
POLYPHAGIA: 0
CONSTIPATION: 1
POLYDIPSIA: 0
DIARRHEA: 0
BLOOD IN STOOL: 0
FEVER: 0
RECTAL PAIN: 0
JAUNDICE: 0
INCREASED ENERGY: 0
ALTERED TEMPERATURE REGULATION: 0
WEIGHT GAIN: 0
DECREASED APPETITE: 0
VOMITING: 0
RECTAL BLEEDING: 0
NIGHT SWEATS: 0
NAUSEA: 0
BLOATING: 1

## 2017-05-26 ENCOUNTER — OFFICE VISIT (OUTPATIENT)
Dept: ONCOLOGY | Facility: CLINIC | Age: 57
End: 2017-05-26
Attending: SURGERY
Payer: COMMERCIAL

## 2017-05-26 VITALS
OXYGEN SATURATION: 94 % | HEIGHT: 73 IN | RESPIRATION RATE: 16 BRPM | BODY MASS INDEX: 26.6 KG/M2 | DIASTOLIC BLOOD PRESSURE: 75 MMHG | TEMPERATURE: 98.1 F | SYSTOLIC BLOOD PRESSURE: 116 MMHG | WEIGHT: 200.7 LBS | HEART RATE: 60 BPM

## 2017-05-26 DIAGNOSIS — C48.0 RETROPERITONEAL SARCOMA (H): Primary | ICD-10-CM

## 2017-05-26 PROCEDURE — 99212 OFFICE O/P EST SF 10 MIN: CPT | Mod: ZF

## 2017-05-26 RX ORDER — ASPIRIN 325 MG
325 TABLET ORAL DAILY
COMMUNITY
End: 2017-07-11

## 2017-05-26 ASSESSMENT — PAIN SCALES - GENERAL: PAINLEVEL: MILD PAIN (2)

## 2017-05-26 NOTE — PROGRESS NOTES
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  We took out part of the psoas muscle, but spared his bowel, ureter, and femoral nerve.  He went home on postoperative day #2 and has done well since his surgery.  He has been eating well.  He has minimal abdominal pain.  His bowel, bladder have been working well.      PHYSICAL EXAMINATION:  Her incision is healing well with no evidence of infection.      IMPRESSION:  Postop check.      PLAN:  His final pathology report revealed a 15 cm dedifferentiated liposarcoma.  There were positive margins.  I informed him about the results of this pathology.  I talked to him about the role for adjuvant therapy.  It is fairly unclear whether or not there is a benefit.  I am going to have him see Dr. An again to discuss any postoperative adjuvant therapy recommendations and also discuss the frequency of surveillance.  He understands that he has a high risk of recurrence.

## 2017-05-26 NOTE — MR AVS SNAPSHOT
"              After Visit Summary   5/26/2017    Rex Jensen    MRN: [id number]           Patient Information     Date Of Birth          1960        Visit Information        Provider Department      5/26/2017 11:45 AM Alvin Bryant MD [facility]        Today's Diagnoses     Retroperitoneal sarcoma (H)    -  1       Follow-ups after your visit        Who to contact     If you have questions or need follow up information about today's clinic visit or your schedule please contact [facility] directly at [phone].  Normal or non-critical lab and imaging results will be communicated to you by BeehiveIDhart, letter or phone within 4 business days after the clinic has received the results. If you do not hear from us within 7 days, please contact the clinic through [email or web address] or phone. If you have a critical or abnormal lab result, we will notify you by phone as soon as possible.  Submit refill requests through Tadcast or call your pharmacy and they will forward the refill request to us. Please allow 3 business days for your refill to be completed.          Additional Information About Your Visit        MyChart Information     T[employer]cast gives you secure access to your electronic health record. If you see a primary care provider, you can also send messages to your care team and make appointments. If you have questions, please call your primary care clinic.  If you do not have a primary care provider, please call [phone] and they will assist you.        Care EveryWhere ID     This is your Care EveryWhere ID. This could be used by other organizations to access your [facility] medical records  [id number]        Your Vitals Were     Pulse Temperature Respirations Height Pulse Oximetry BMI (Body Mass Index)    60 98.1  F (36.7  C) (Oral) 16 1.854 m (6' 0.99\") 94% 26.48 kg/m2       Blood Pressure from Last 3 Encounters:   05/26/17 116/75   05/11/17 132/68   05/02/17 114/90    Weight from Last 3 " Encounters:   05/26/17 91 kg (200 lb 11.2 oz)   05/11/17 91.2 kg (201 lb)   05/02/17 94 kg (207 lb 4.8 oz)              Today, you had the following     No orders found for display       Primary Care Provider Office Phone # Fax #    Js Magdaleno PA-C [phone]       [facility] [place] [address]  [place] [address]        Thank you!     Thank you for choosing [facility]  for your care. Our goal is always to provide you with excellent care. Hearing back from our patients is one way we can continue to improve our services. Please take a few minutes to complete the written survey that you may receive in the mail after your visit with us. Thank you!             Your Updated Medication List - Protect others around you: Learn how to safely use, store and throw away your medicines at www.disposemymeds.org.          This list is accurate as of: 5/26/17 11:59 PM.  Always use your most recent med list.                   Brand Name Dispense Instructions for use    acetaminophen 325 MG tablet    TYLENOL    112 tablet    Take 2 tablets (650 mg) by mouth every 6 hours as needed for mild pain       aspirin 325 MG tablet      Take 325 mg by mouth daily       enoxaparin 40 MG/0.4ML injection    LOVENOX    11.2 mL    Inject 0.4 mLs (40 mg) Subcutaneous every 24 hours for 28 days       oxyCODONE 5 MG IR tablet    ROXICODONE    30 tablet    Take 1 tablet (5 mg) by mouth every 4 hours as needed for moderate to severe pain       senna-docusate 8.6-50 MG per tablet    DHRUV-COLACE    30 tablet    Take 1 tablet by mouth 2 times daily as needed for constipation

## 2017-05-26 NOTE — LETTER
5/26/2017       RE: Rex Jensen  [address]  [place] [facility] [address]     Dear Colleague,    Thank you for referring your patient, Rex Jensen, to the [facility] at [facility]. Please see a copy of my visit note below.    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  We took out part of the psoas muscle, but spared his bowel, ureter, and femoral nerve.  He went home on postoperative day #2 and has done well since his surgery.  He has been eating well.  He has minimal abdominal pain.  His bowel, bladder have been working well.      PHYSICAL EXAMINATION:  Her incision is healing well with no evidence of infection.      IMPRESSION:  Postop check.      PLAN:  His final pathology report revealed a 15 cm dedifferentiated liposarcoma.  There were positive margins.  I informed him about the results of this pathology.  I talked to him about the role for adjuvant therapy.  It is fairly unclear whether or not there is a benefit.  I am going to have him see Dr. An again to discuss any postoperative adjuvant therapy recommendations and also discuss the frequency of surveillance.  He understands that he has a high risk of recurrence.       Again, thank you for allowing me to participate in the care of your patient.      Sincerely,    Alvin Bryant MD

## 2017-05-26 NOTE — NURSING NOTE
"Oncology Rooming Note    May 26, 2017 11:58 AM   Rex Jensen is a 57 year old male who presents for:    Chief Complaint   Patient presents with     Oncology Clinic Visit     2 week post op     Initial Vitals: /75 (BP Location: Right arm, Patient Position: Chair, Cuff Size: Adult Regular)  Pulse 60  Temp 98.1  F (36.7  C) (Oral)  Resp 16  Ht 1.854 m (6' 0.99\")  Wt 91 kg (200 lb 11.2 oz)  SpO2 94%  BMI 26.48 kg/m2 Estimated body mass index is 26.48 kg/(m^2) as calculated from the following:    Height as of this encounter: 1.854 m (6' 0.99\").    Weight as of this encounter: 91 kg (200 lb 11.2 oz). Body surface area is 2.16 meters squared.  Mild Pain (2) Comment: left side-surgery site   No LMP for male patient.  Allergies reviewed: Yes  Medications reviewed: Yes    Medications: Medication refills not needed today.  Pharm[facility] into [facility] [address] [place], [place] - [address]    Clinical concerns: no clinical concerns provider was notified.    7 minutes for nursing intake (face to face time)     Keely Whitt CMA              "

## 2017-07-11 ENCOUNTER — OFFICE VISIT (OUTPATIENT)
Dept: FAMILY MEDICINE | Facility: CLINIC | Age: 57
End: 2017-07-11
Payer: COMMERCIAL

## 2017-07-11 VITALS
HEART RATE: 99 BPM | TEMPERATURE: 98.6 F | DIASTOLIC BLOOD PRESSURE: 82 MMHG | BODY MASS INDEX: 26.31 KG/M2 | WEIGHT: 199.4 LBS | SYSTOLIC BLOOD PRESSURE: 135 MMHG | OXYGEN SATURATION: 96 %

## 2017-07-11 DIAGNOSIS — D22.9 ATYPICAL NEVI: ICD-10-CM

## 2017-07-11 DIAGNOSIS — L30.9 DERMATITIS: Primary | ICD-10-CM

## 2017-07-11 PROCEDURE — 99213 OFFICE O/P EST LOW 20 MIN: CPT | Performed by: NURSE PRACTITIONER

## 2017-07-11 RX ORDER — MUPIROCIN 20 MG/G
OINTMENT TOPICAL 3 TIMES DAILY
Qty: 22 G | Refills: 1 | Status: SHIPPED | OUTPATIENT
Start: 2017-07-11 | End: 2017-07-16

## 2017-07-11 RX ORDER — HYDROCORTISONE VALERATE 2 MG/G
OINTMENT TOPICAL
Qty: 45 G | Refills: 0 | Status: SHIPPED | OUTPATIENT
Start: 2017-07-11 | End: 2021-01-28

## 2017-07-11 ASSESSMENT — ANXIETY QUESTIONNAIRES
7. FEELING AFRAID AS IF SOMETHING AWFUL MIGHT HAPPEN: NOT AT ALL
2. NOT BEING ABLE TO STOP OR CONTROL WORRYING: NOT AT ALL
1. FEELING NERVOUS, ANXIOUS, OR ON EDGE: NOT AT ALL
GAD7 TOTAL SCORE: 0
3. WORRYING TOO MUCH ABOUT DIFFERENT THINGS: NOT AT ALL
5. BEING SO RESTLESS THAT IT IS HARD TO SIT STILL: NOT AT ALL
6. BECOMING EASILY ANNOYED OR IRRITABLE: NOT AT ALL

## 2017-07-11 ASSESSMENT — PATIENT HEALTH QUESTIONNAIRE - PHQ9: 5. POOR APPETITE OR OVEREATING: NOT AT ALL

## 2017-07-11 NOTE — NURSING NOTE
"Chief Complaint   Patient presents with     Rash       Initial /82  Pulse 99  Temp 98.6  F (37  C) (Oral)  Wt 199 lb 6.4 oz (90.4 kg)  SpO2 96%  BMI 26.31 kg/m2 Estimated body mass index is 26.31 kg/(m^2) as calculated from the following:    Height as of 5/26/17: 6' 0.99\" (1.854 m).    Weight as of this encounter: 199 lb 6.4 oz (90.4 kg).  Medication Reconciliation: complete     Ilana Rodriguez MA  "

## 2017-07-11 NOTE — PROGRESS NOTES
SUBJECTIVE:                                                    Rex Jensen is a 57 year old male who presents to clinic today for the following health issues:      Rash      Duration: yesterday    Description  Location: buttocks-rash and painful  Itching: moderate    Intensity:  moderate    Precipitating or alleviating factors:  New exposures:  Was on boat this weekend- might have come into contact with   Recent travel: no      Therapies tried and outcome: none      Problem list and histories reviewed & adjusted, as indicated.  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     Disturbance of skin sensation     DEGENERATED DISK DISEASE CERVICAL-NO MYELOPATHY     CARDIOVASCULAR SCREENING; LDL GOAL LESS THAN 130     Advanced directives, counseling/discussion     Retroperitoneal mass     Sarcoma of soft tissue (H)     Retroperitoneal sarcoma (H)     Past Surgical History:   Procedure Laterality Date     HC ARTHROTOMY/EXPLORE/TREAT KNEE JOINT      kicked by cow     RESECT TUMOR RETROPERITONEAL N/A 5/9/2017    Procedure: RESECT TUMOR RETROPERITONEAL;  Resection Retroperitoneal Mass   Anesthesia general with Block   ;  Surgeon: Alvin Bryant MD;  Location:  OR       Social History   Substance Use Topics     Smoking status: Former Smoker     Smokeless tobacco: Never Used      Comment: quit at age 25      Alcohol use No     Family History   Problem Relation Age of Onset     Respiratory Mother      CEREBROVASCULAR DISEASE Maternal Grandmother      HEART DISEASE Maternal Grandmother      Alzheimer Disease Paternal Grandfather      Musculoskeletal Disorder Paternal Grandfather      parkinson     GASTROINTESTINAL DISEASE Son      ulcer     DIABETES No family hx of      Alcohol/Drug No family hx of      Psychotic Disorder No family hx of           Current Outpatient Prescriptions   Medication Sig Dispense Refill     mupirocin (BACTROBAN) 2 % ointment Apply topically 3 times daily for 5 days 22 g 1     hydrocortisone valerate (WEST-LISETH) 0.2 % ointment Apply sparingly to affected area three times daily as needed. 45 g 0     Allergies   Allergen Reactions     Codeine      Other reaction(s): Intolerance-Can't Take  halluciation     Morphine      Other reaction(s): Intolerance-Can't Take     BP Readings from Last 3 Encounters:   07/11/17 135/82   05/26/17 116/75   05/11/17 132/68    Wt Readings from Last 3 Encounters:   07/11/17 199 lb 6.4 oz (90.4 kg)   05/26/17 200 lb 11.2 oz (91 kg)   05/11/17 201 lb (91.2 kg)                  Labs reviewed in EPIC    Reviewed and updated as needed this visit by clinical staff  Tobacco  Allergies  Meds  Med Hx  Surg Hx  Fam Hx  Soc Hx      Reviewed and updated as needed this visit by Provider         ROS:  Constitutional, HEENT, cardiovascular, pulmonary, GI, , musculoskeletal, neuro, skin, endocrine and psych systems are negative, except as otherwise noted.    OBJECTIVE:     /82  Pulse 99  Temp 98.6  F (37  C) (Oral)  Wt 199 lb 6.4 oz (90.4 kg)  SpO2 96%  BMI 26.31 kg/m2  Body mass index is 26.31 kg/(m^2).  GENERAL: healthy, alert and no distress  RESP: lungs clear to auscultation - no rales, rhonchi or wheezes  CV: regular rate and rhythm, normal S1 S2, no S3 or S4, no murmur, click or rub, no peripheral edema and peripheral pulses strong  ABDOMEN: soft, nontender, no hepatosplenomegaly, no masses and bowel sounds normal  SKIN: no suspicious lesions POSITIVE for for large patch of erythema with open scratched areas to buttocks/ left side with open scratched areas, consistent with contact dermatitis. Two atypical nevi: near L eye and back; referral to dermatology as pt has hx skin CA  PSYCH: mentation appears normal, affect normal/bright    Diagnostic Test Results:  See orders    ASSESSMENT/PLAN:          ICD-10-CM    1. Dermatitis L30.9 mupirocin (BACTROBAN) 2 % ointment     hydrocortisone valerate (WEST-LISETH) 0.2 % ointment    with open areas due to scratching   2. Atypical nevi D22.9 DERMATOLOGY REFERRAL    Left face near eye and back       See Patient Instructions: Try not to scratch rash.  Use both ointments as prescribed, follow up if symptoms worsen. Schedule with dermatology as soon as available.     Merlene Tamayo, LOI  [facility] [place]

## 2017-07-11 NOTE — LETTER
My Depression Action Plan  Name: Rex Jensen   Date of Birth 1960  Date: 7/11/2017    My doctor: Js Magdaleno   My clinic: [address]  [place] [id number] [phone]          GREEN    ZONE   Good Control    What it looks like:     Things are going generally well. You have normal up s and down s. You may even feel depressed from time to time, but bad moods usually last less than a day.   What you need to do:  1. Continue to care for yourself (see self care plan)  2. Check your depression survival kit and update it as needed  3. Follow your physician s recommendations including any medication.  4. Do not stop taking medication unless you consult with your physician first.           YELLOW         ZONE Getting Worse    What it looks like:     Depression is starting to interfere with your life.     It may be hard to get out of bed; you may be starting to isolate yourself from others.    Symptoms of depression are starting to last most all day and this has happened for several days.     You may have suicidal thoughts but they are not constant.   What you need to do:     1. Call your care team, your response to treatment will improve if you keep your care team informed of your progress. Yellow periods are signs an adjustment may need to be made.     2. Continue your self-care, even if you have to fake it!    3. Talk to someone in your support network    4. Open up your depression survival kit           RED    ZONE Medical Alert - Get Help    What it looks like:     Depression is seriously interfering with your life.     You may experience these or other symptoms: You can t get out of bed most days, can t work or engage in other necessary activities, you have trouble taking care of basic hygiene, or basic responsibilities, thoughts of suicide or death that will not go away, self-injurious behavior.     What you need to do:  1. Call your care team and  request a same-day appointment. If they are not available (weekends or after hours) call your local crisis line, emergency room or 911.      Electronically signed by: Ilana Rodriguez, July 11, 2017    Depression Self Care Plan / Survival Kit    Self-Care for Depression  Here s the deal. Your body and mind are really not as separate as most people think.  What you do and think affects how you feel and how you feel influences what you do and think. This means if you do things that people who feel good do, it will help you feel better.  Sometimes this is all it takes.  There is also a place for medication and therapy depending on how severe your depression is, so be sure to consult with your medical provider and/ or Behavioral Health Consultant if your symptoms are worsening or not improving.     In order to better manage my stress, I will:    Exercise  Get some form of exercise, every day. This will help reduce pain and release endorphins, the  feel good  chemicals in your brain. This is almost as good as taking antidepressants!  This is not the same as joining a gym and then never going! (they count on that by the way ) It can be as simple as just going for a walk or doing some gardening, anything that will get you moving.      Hygiene   Maintain good hygiene (Get out of bed in the morning, Make your bed, Brush your teeth, Take a shower, and Get dressed like you were going to work, even if you are unemployed).  If your clothes don't fit try to get ones that do.    Diet  I will strive to eat foods that are good for me, drink plenty of water, and avoid excessive sugar, caffeine, alcohol, and other mood-altering substances.  Some foods that are helpful in depression are: complex carbohydrates, B vitamins, flaxseed, fish or fish oil, fresh fruits and vegetables.    Psychotherapy  I agree to participate in Individual Therapy (if recommended).    Medication  If prescribed medications, I agree to take them.  Missing doses  can result in serious side effects.  I understand that drinking alcohol, or other illicit drug use, may cause potential side effects.  I will not stop my medication abruptly without first discussing it with my provider.    Staying Connected With Others  I will stay in touch with my friends, family members, and my primary care provider/team.    Use your imagination  Be creative.  We all have a creative side; it doesn t matter if it s oil painting, sand castles, or mud pies! This will also kick up the endorphins.    Witness Beauty  (AKA stop and smell the roses) Take a look outside, even in mid-winter. Notice colors, textures. Watch the squirrels and birds.     Service to others  Be of service to others.  There is always someone else in need.  By helping others we can  get out of ourselves  and remember the really important things.  This also provides opportunities for practicing all the other parts of the program.    Humor  Laugh and be silly!  Adjust your TV habits for less news and crime-drama and more comedy.    Control your stress  Try breathing deep, massage therapy, biofeedback, and meditation. Find time to relax each day.     My support system    Clinic Contact:  Phone number:    Contact 1:  Phone number:    Contact 2:  Phone number:    Mormon/:  Phone number:    Therapist:  Phone number:    Local crisis center:    Phone number:    Other community support:  Phone number:

## 2017-07-11 NOTE — MR AVS SNAPSHOT
"              After Visit Summary   7/11/2017    Rex Jensen    MRN: [id number]           Patient Information     Date Of Birth          1960        Visit Information        Provider Department      7/11/2017 4:20 PM Merlene Tamayo NP [facility]        Today's Diagnoses     Atypical nevi    -  1    Dermatitis          Care Instructions    Try not to scratch rash.  Use both ointments as prescribed, follow up if symptoms worsen. Schedule with dermatology as soon as available.       Contact Dermatitis  Contact dermatitis is a skin rash caused by something that touches the skin and makes it irritated and inflamed. Your skin may be red, swollen, dry, and may be cracked. Blisters may form and ooze. The rash will itch.  Contact dermatitis can form on the face and neck, backs of hands, forearms, genitals, and lower legs.  People can get contact dermatitis from lots of sources. These include:    Plants such as poison ivy, oak, or sumac    Chemicals in hair dyes and rinses, soaps, solvents, waxes, fingernail polish, and deodorants     Jewelry or watchbands made of nickel  Contact dermatitis is not passed from person to person.  Talk with your healthcare provider about what may have caused the rash. A type of allergy testing called \"patch testing\" may be used to discover what you are allergic to. You will need to avoid the source of your rash in the future to prevent it from coming back.  Treatment is done to relieve itching and prevent the rash from coming back. The rash should go away in a few days to a few weeks.  Home care  Your healthcare provider may prescribe medicine to relieve swelling and itching. Follow all instructions when using these medicines.  General care:    Avoid anything that heats up your skin, such as hot showers or baths, or direct sunlight. This can make itching worse.    Apply cold compresses to soothe your sores to help relieve your symptoms. Do this for 30 minutes 3 to 4 times " a day. You can make a cold compress by soaking a cloth in cold water. Squeeze out excess water. You can add colloidal oatmeal to the water to help reduce itching. For severe itching in a small area, apply an ice pack wrapped in a thin towel. Do this for 20 minutes 3 to 4 times a day.    You can also try wet dressings. One way to do this is to wear a wet piece of clothing under a dry one. Wear a damp shirt under a dry shirt if your upper body is affected. This can relieve itching and prevent you from scratching the affected area.    You can also help relieve large areas of itching by taking a lukewarm bath with colloidal oatmeal added to the water.    Use hydrocortisone cream for redness and irritation, unless another medicine was prescribed. You can also use benzocaine anesthetic cream or spray. Calamine lotion can also relieve mild symptoms.    Use oral diphenhydramine to help reduce itching. You can buy this antihistamine at drug and grocery stores. It can make you sleepy, so use lower doses during the daytime. Or you can use loratadine. This is an antihistamine that will not make you sleepy. Do not use diphenhydramine if you have glaucoma or have trouble urinating due to an enlarged prostate.    If a plant causes your rash, make sure to wash your skin and the clothes you were wearing when you came into contact with the plant. This is to wash away the plant oils that gave you the rash and prevent more or worse symptoms.    Stay away from the substance or object that causes your symptoms. If you can t avoid it, wear gloves or some other type of protection.  Follow-up care  Follow up with your healthcare provider, or as advised.  When to seek medical advice  Call your healthcare provider right away if any of these occur:    Spreading of the rash to other parts of your body    Severe swelling of your face, eyelids, mouth, throat or tongue    Trouble urinating due to swelling in the genital area    Fever of 100.4 F  (38 C) or higher    Redness or swelling that gets worse    Pain that gets worse    Foul-smelling fluid leaking from the skin    Yellow-brown crusts on the open blisters  Date Last Reviewed: 9/1/2016 2000-2017 [employer]. [address] [place] [address]. All rights reserved. This information is not intended as a substitute for professional medical care. Always follow your healthcare professional's instructions.                Follow-ups after your visit        Additional Services     DERMATOLOGY REFERRAL       Your provider has referred you to: [facility]: [facility] [phone]   [email or web address]  [facility] [place] [facility] [phone]   [email or web address]  [facility] [place] [phone]   [email or web address]    Please be aware that coverage of these services is subject to the terms and limitations of your health insurance plan.  Call member services at your health plan with any benefit or coverage questions.      Please bring the following with you to your appointment:    (1) Any X-Rays, CTs or MRIs which have been performed.  Contact the facility where they were done to arrange for  prior to your scheduled appointment.    (2) List of current medications  (3) This referral request   (4) Any documents/labs given to you for this referral                  Follow-up notes from your care team     Return if symptoms worsen or fail to improve.      Who to contact     Normal or non-critical lab and imaging results will be communicated to you by MyChart, letter or phone within 4 business days after the clinic has received the results. If you do not hear from us within 7 days, please contact the clinic through MyChart or phone. If you have a critical or abnormal lab result, we will notify you by phone as soon as possible.  Submit refill requests through [employer] or call your  pharmacy and they will forward the refill request to us. Please allow 3 business days for your refill to be completed.          If you need to speak with a  for additional information , please call: [phone]             Additional Information About Your Visit        [employer] Information     [employer] gives you secure access to your electronic health record. If you see a primary care provider, you can also send messages to your care team and make appointments. If you have questions, please call your primary care clinic.  If you do not have a primary care provider, please call [phone] and they will assist you.        Care EveryWhere ID     This is your Care EveryWhere ID. This could be used by other organizations to access your [facility] medical records  [id number]        Your Vitals Were     Pulse Temperature Pulse Oximetry BMI (Body Mass Index)          99 98.6  F (37  C) (Oral) 96% 26.31 kg/m2         Blood Pressure from Last 3 Encounters:   07/11/17 135/82   05/26/17 116/75   05/11/17 132/68    Weight from Last 3 Encounters:   07/11/17 199 lb 6.4 oz (90.4 kg)   05/26/17 200 lb 11.2 oz (91 kg)   05/11/17 201 lb (91.2 kg)              We Performed the Following     DEPRESSION ACTION PLAN (DAP)     DERMATOLOGY REFERRAL          Today's Medication Changes          These changes are accurate as of: 7/11/17  4:38 PM.  If you have any questions, ask your nurse or doctor.               Start taking these medicines.        Dose/Directions    hydrocortisone valerate 0.2 % ointment   Commonly known as:  WEST-LISETH   Used for:  Dermatitis   Started by:  Merlene Tamayo NP        Apply sparingly to affected area three times daily as needed.   Quantity:  45 g   Refills:  0       mupirocin 2 % ointment   Commonly known as:  BACTROBAN   Used for:  Dermatitis   Started by:  Merlene Tamayo NP        Apply topically 3 times daily for 5 days   Quantity:  22 g   Refills:  1         Stop taking these medicines  if you haven't already. Please contact your care team if you have questions.     acetaminophen 325 MG tablet   Commonly known as:  TYLENOL   Stopped by:  Merlene Tamayo NP           aspirin 325 MG tablet   Stopped by:  Merlene Tamayo NP                Where to get your medicines      These medications were sent to [facility] [place] - [address]  [address], [place] [address]     Phone:  [phone]     hydrocortisone valerate 0.2 % ointment    mupirocin 2 % ointment                Primary Care Provider Office Phone # Fax #    Js Magdaleno PA-C [phone]       [facility] [address]  [place] [address]        Equal Access to Services     [facility]: Hadii aad ku hadasho Soomaali, waaxda luqadaha, qaybta kaalmada adeegyada, waxay idiin hayaan adegildardo kharagabrielle mendez . So [facility] [phone].    ATENCIÓN: Si habla español, tiene a carlisle disposición servicios gratuitos de asistencia lingüística. Francisco al [phone].    We comply with applicable federal civil rights laws and [place] laws. We do not discriminate on the basis of race, color, national origin, age, disability sex, sexual orientation or gender identity.            Thank you!     Thank you for choosing [facility]  for your care. Our goal is always to provide you with excellent care. Hearing back from our patients is one way we can continue to improve our services. Please take a few minutes to complete the written survey that you may receive in the mail after your visit with us. Thank you!             Your Updated Medication List - Protect others around you: Learn how to safely use, store and throw away your medicines at www.disposemymeds.org.          This list is accurate as of: 7/11/17  4:38 PM.  Always use your most recent med list.                   Brand Name Dispense Instructions for use Diagnosis    hydrocortisone valerate 0.2 %  ointment    WEST-LISETH    45 g    Apply sparingly to affected area three times daily as needed.    Dermatitis       mupirocin 2 % ointment    BACTROBAN    22 g    Apply topically 3 times daily for 5 days    Dermatitis

## 2017-07-11 NOTE — PATIENT INSTRUCTIONS
"Try not to scratch rash.  Use both ointments as prescribed, follow up if symptoms worsen. Schedule with dermatology as soon as available.       Contact Dermatitis  Contact dermatitis is a skin rash caused by something that touches the skin and makes it irritated and inflamed. Your skin may be red, swollen, dry, and may be cracked. Blisters may form and ooze. The rash will itch.  Contact dermatitis can form on the face and neck, backs of hands, forearms, genitals, and lower legs.  People can get contact dermatitis from lots of sources. These include:    Plants such as poison ivy, oak, or sumac    Chemicals in hair dyes and rinses, soaps, solvents, waxes, fingernail polish, and deodorants     Jewelry or watchbands made of nickel  Contact dermatitis is not passed from person to person.  Talk with your healthcare provider about what may have caused the rash. A type of allergy testing called \"patch testing\" may be used to discover what you are allergic to. You will need to avoid the source of your rash in the future to prevent it from coming back.  Treatment is done to relieve itching and prevent the rash from coming back. The rash should go away in a few days to a few weeks.  Home care  Your healthcare provider may prescribe medicine to relieve swelling and itching. Follow all instructions when using these medicines.  General care:    Avoid anything that heats up your skin, such as hot showers or baths, or direct sunlight. This can make itching worse.    Apply cold compresses to soothe your sores to help relieve your symptoms. Do this for 30 minutes 3 to 4 times a day. You can make a cold compress by soaking a cloth in cold water. Squeeze out excess water. You can add colloidal oatmeal to the water to help reduce itching. For severe itching in a small area, apply an ice pack wrapped in a thin towel. Do this for 20 minutes 3 to 4 times a day.    You can also try wet dressings. One way to do this is to wear a wet piece of " clothing under a dry one. Wear a damp shirt under a dry shirt if your upper body is affected. This can relieve itching and prevent you from scratching the affected area.    You can also help relieve large areas of itching by taking a lukewarm bath with colloidal oatmeal added to the water.    Use hydrocortisone cream for redness and irritation, unless another medicine was prescribed. You can also use benzocaine anesthetic cream or spray. Calamine lotion can also relieve mild symptoms.    Use oral diphenhydramine to help reduce itching. You can buy this antihistamine at drug and grocery stores. It can make you sleepy, so use lower doses during the daytime. Or you can use loratadine. This is an antihistamine that will not make you sleepy. Do not use diphenhydramine if you have glaucoma or have trouble urinating due to an enlarged prostate.    If a plant causes your rash, make sure to wash your skin and the clothes you were wearing when you came into contact with the plant. This is to wash away the plant oils that gave you the rash and prevent more or worse symptoms.    Stay away from the substance or object that causes your symptoms. If you can t avoid it, wear gloves or some other type of protection.  Follow-up care  Follow up with your healthcare provider, or as advised.  When to seek medical advice  Call your healthcare provider right away if any of these occur:    Spreading of the rash to other parts of your body    Severe swelling of your face, eyelids, mouth, throat or tongue    Trouble urinating due to swelling in the genital area    Fever of 100.4 F (38 C) or higher    Redness or swelling that gets worse    Pain that gets worse    Foul-smelling fluid leaking from the skin    Yellow-brown crusts on the open blisters  Date Last Reviewed: 9/1/2016 2000-2017 [employer]. [address] [place] [address], [place] [address]. All rights reserved. This information is not intended as a substitute for  professional medical care. Always follow your healthcare professional's instructions.

## 2017-07-12 ASSESSMENT — ANXIETY QUESTIONNAIRES: GAD7 TOTAL SCORE: 0

## 2017-07-12 ASSESSMENT — PATIENT HEALTH QUESTIONNAIRE - PHQ9: SUM OF ALL RESPONSES TO PHQ QUESTIONS 1-9: 0

## 2017-07-20 ENCOUNTER — TELEPHONE (OUTPATIENT)
Dept: FAMILY MEDICINE | Facility: CLINIC | Age: 57
End: 2017-07-20

## 2017-07-20 NOTE — TELEPHONE ENCOUNTER
Patient was in 07/11 was referred to specialist and would like to know where and contact information. Please call to discuss. Thank you.

## 2017-09-07 ENCOUNTER — OFFICE VISIT (OUTPATIENT)
Dept: DERMATOLOGY | Facility: CLINIC | Age: 57
End: 2017-09-07
Payer: COMMERCIAL

## 2017-09-07 VITALS — OXYGEN SATURATION: 98 % | SYSTOLIC BLOOD PRESSURE: 124 MMHG | DIASTOLIC BLOOD PRESSURE: 86 MMHG | HEART RATE: 57 BPM

## 2017-09-07 DIAGNOSIS — D18.01 CHERRY ANGIOMA: ICD-10-CM

## 2017-09-07 DIAGNOSIS — D22.9 MULTIPLE BENIGN NEVI: ICD-10-CM

## 2017-09-07 DIAGNOSIS — L81.4 LENTIGO: ICD-10-CM

## 2017-09-07 DIAGNOSIS — L70.0 GIANT COMEDO: ICD-10-CM

## 2017-09-07 DIAGNOSIS — L82.1 SEBORRHEIC KERATOSIS: Primary | ICD-10-CM

## 2017-09-07 PROCEDURE — 99243 OFF/OP CNSLTJ NEW/EST LOW 30: CPT | Performed by: PHYSICIAN ASSISTANT

## 2017-09-07 NOTE — NURSING NOTE
"Initial /86  Pulse 57  SpO2 98% Estimated body mass index is 26.31 kg/(m^2) as calculated from the following:    Height as of 5/26/17: 1.854 m (6' 0.99\").    Weight as of 7/11/17: 90.4 kg (199 lb 6.4 oz). .      "

## 2017-09-07 NOTE — MR AVS SNAPSHOT
After Visit Summary   9/7/2017    Rex Jensen    MRN: [id number]           Patient Information     Date Of Birth          1960        Visit Information        Provider Department      9/7/2017 2:20 PM Genet Kan PA-C [facility]        Today's Diagnoses     Seborrheic keratosis    -  1    Lentigo        Cooper angioma        Multiple benign nevi        Giant comedo           Follow-ups after your visit        Who to contact     If you have questions or need follow up information about today's clinic visit or your schedule please contact [facility] directly at [phone].  Normal or non-critical lab and imaging results will be communicated to you by KO-SUhart, letter or phone within 4 business days after the clinic has received the results. If you do not hear from us within 7 days, please contact the clinic through KO-SUhart or phone. If you have a critical or abnormal lab result, we will notify you by phone as soon as possible.  Submit refill requests through [employer] or call your pharmacy and they will forward the refill request to us. Please allow 3 business days for your refill to be completed.          Additional Information About Your Visit        MyChart Information     [employer] gives you secure access to your electronic health record. If you see a primary care provider, you can also send messages to your care team and make appointments. If you have questions, please call your primary care clinic.  If you do not have a primary care provider, please call [phone] and they will assist you.        Care EveryWhere ID     This is your Care EveryWhere ID. This could be used by other organizations to access your [facility] medical records  [id number]        Your Vitals Were     Pulse Pulse Oximetry                57 98%           Blood Pressure from Last 3 Encounters:   09/07/17 124/86   07/11/17 135/82   05/26/17 116/75    Weight from Last 3 Encounters:    07/11/17 90.4 kg (199 lb 6.4 oz)   05/26/17 91 kg (200 lb 11.2 oz)   05/11/17 91.2 kg (201 lb)              Today, you had the following     No orders found for display       Primary Care Provider Office Phone # Fax #    Js Magdaleno PA-C [phone]       [address]  [place] [address]        Equal Access to Services     [facility] : Hadii aad ku hadasho Soomaali, waaxda luqadaha, qaybta kaalmada adeegyada, waxay idiin hayaan adeeg kharash la'aan . So [facility] [phone].    ATENCIÓN: Si habla español, tiene a carlisle disposición servicios gratuitos de asistencia lingüística. [facility] [phone].    We comply with applicable federal civil rights laws and [place] laws. We do not discriminate on the basis of race, color, national origin, age, disability sex, sexual orientation or gender identity.            Thank you!     Thank you for choosing [facility]  for your care. Our goal is always to provide you with excellent care. Hearing back from our patients is one way we can continue to improve our services. Please take a few minutes to complete the written survey that you may receive in the mail after your visit with us. Thank you!             Your Updated Medication List - Protect others around you: Learn how to safely use, store and throw away your medicines at www.disposemymeds.org.          This list is accurate as of: 9/7/17  9:19 PM.  Always use your most recent med list.                   Brand Name Dispense Instructions for use Diagnosis    hydrocortisone valerate 0.2 % ointment    WEST-LISETH    45 g    Apply sparingly to affected area three times daily as needed.    Dermatitis

## 2017-09-08 NOTE — PROGRESS NOTES
Rex Jensen is a 57 year old year old male patient here today for consultation of spot on back and spot below eye requested by Merlene Tamayo NP. Patient reports that he doesn't usually wear sunscreen. He denies any pain or bleeding with spots. Patient has no other skin complaints today.  Remainder of the HPI, Meds, PMH, Allergies, FH, and SH was reviewed in chart.    Pertinent Hx:   No personal history of skin cancer.   Past Medical History:   Diagnosis Date     Esophageal reflux      Lyme disease      Memory loss     Trouble with memory loss, ability to think clearly, and  concentration.     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       Past Surgical History:   Procedure Laterality Date     HC ARTHROTOMY/EXPLORE/TREAT KNEE JOINT      kicked by cow     RESECT TUMOR RETROPERITONEAL N/A 5/9/2017    Procedure: RESECT TUMOR RETROPERITONEAL;  Resection Retroperitoneal Mass   Anesthesia general with Block   ;  Surgeon: Alvin Bryant MD;  Location: [facility]        Family History   Problem Relation Age of Onset     Respiratory Mother      CEREBROVASCULAR DISEASE Maternal Grandmother      HEART DISEASE Maternal Grandmother      Alzheimer Disease Paternal Grandfather      Musculoskeletal Disorder Paternal Grandfather      parkinson     GASTROINTESTINAL DISEASE Son      ulcer     DIABETES No family hx of      Alcohol/Drug No family hx of      Psychotic Disorder No family hx of        Social History     Social History     Marital status:      Spouse name: N/A     Number of children: 8     Years of education: N/A     Occupational History     Not on file.     Social History Main Topics     Smoking status: Former Smoker     Smokeless tobacco: Never Used      Comment: quit at age 25      Alcohol use No     Drug use: No     Sexual activity: Yes     Partners: Female     Other Topics Concern     Not on file     Social History Narrative       Outpatient Encounter Prescriptions as of 9/7/2017   Medication Sig Dispense Refill      hydrocortisone valerate (WEST-LISETH) 0.2 % ointment Apply sparingly to affected area three times daily as needed. (Patient not taking: Reported on 9/7/2017) 45 g 0     No facility-administered encounter medications on file as of 9/7/2017.              Review Of Systems  Skin: As above  Eyes: negative  Ears/Nose/Throat: negative  Respiratory: No shortness of breath, dyspnea on exertion, cough, or hemoptysis  Cardiovascular: negative  Gastrointestinal: negative  Genitourinary: negative  Musculoskeletal: negative  Neurologic: negative  Psychiatric: negative  Hematologic/Lymphatic/Immunologic: negative  Endocrine: negative      O:   NAD, WDWN, Alert & Oriented, Mood & Affect wnl, Vitals stable   Here today alone   /86  Pulse 57  SpO2 98%   General appearance normal   Vitals stable   Alert, oriented and in no acute distress      Black comedone on left lower eyelid (medial    Brown macule, brown stuck on papules, and red papules on torso and upper extremities   Brown papules and macules with regular pigment network and borders on torso and upper extremities       The remainder of expanded problem focused exam was unremarkable; the following areas were examined:  scalp/hair, conjunctiva/lids, face, neck, lips/teeth, chest, digits/nails, RUE, LUE.      Eyes: Conjunctivae/lids:Normal     ENT: Lips    MSK:Normal    Pulm: Breathing Normal    Neuro/Psych: Orientation:Normal; Mood/Affect:Normal  A/P:  1. Giant comedo/small cyst  on left lower eyelid  With patient consent. Unroofed and expressed. No charge.   2. Seborrheic keratosis, lentigo, cherry angioma, benign nevi   BENIGN LESIONS DISCUSSED WITH PATIENT:  I discussed the specifics of tumor, prognosis, and genetics of benign lesions.  I explained that treatment of these lesions would be purely cosmetic and not medically neccessary.  I discussed with patient different removal options including excision, cautery and /or laser.      Nature and genetics of benign skin  lesions dicussed with patient.  Signs and Symptoms of skin cancer discussed with patient.  ABCDEs of melanoma reviewed with patient.  Patient encouraged to perform monthly skin exams.  UV precautions reviewed with patient.  Skin care regimen reviewed with patient: Eliminate harsh soaps, i.e. Dial, zest, irsih spring; Mild soaps such as Cetaphil or Dove sensitive skin, avoid hot or cold showers, aggressive use of emollients including vanicream, cetaphil or cerave discussed with patient.    Risks of non-melanoma skin cancer discussed with patient   Return to clinic one year or sooner if needed.

## 2017-11-24 ENCOUNTER — TELEPHONE (OUTPATIENT)
Dept: FAMILY MEDICINE | Facility: CLINIC | Age: 57
End: 2017-11-24

## 2017-11-24 NOTE — TELEPHONE ENCOUNTER
Wife calling with Rex in room they have concerns about 1 testicle swollen with water and fluid 3 x larger than other.    Caroline CARRANZA

## 2017-11-24 NOTE — TELEPHONE ENCOUNTER
"Pt reports noting 1 if testicles slowly increasing in size over past week, swollen to 3x normal size now with collection of fluid in scrotal sac.  Denies pain, reports \"toothache\" like intermittent sensation. Not taking any analgesic for this.   Denies difficulty passing urine, no UTI sx, no issues with bowels.  Denies fevers, chills.  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 Denies any post surg complications.   Due to nature of sx and PMH advised ED to eval today. Pt hesitant to commit going to ED today.  Requested RN speak with wife, reinforced ED visit today for eval.  States she will discuss with pt. And convince him importance of ED visit today rather than waiting over wknd until Mon.  HERNESTO Olivo RN    "

## 2018-05-15 ENCOUNTER — TELEPHONE (OUTPATIENT)
Dept: FAMILY MEDICINE | Facility: CLINIC | Age: 58
End: 2018-05-15

## 2018-05-15 DIAGNOSIS — Z12.11 COLON CANCER SCREENING: Primary | ICD-10-CM

## 2018-05-15 NOTE — TELEPHONE ENCOUNTER
Panel Management Review    Summary:    Patient is due/failing the following:   FIT    Type of outreach:    Sent Onit message.    Questions for provider review:    None                                                                                                                                    Ilana Rodriguez MA       Chart routed to Care Team .

## 2018-05-15 NOTE — LETTER
June 11, 2018      Rex Jensen  [address]  [place] [address]        Dear Rex,       We at [facility] are trying to provide the best care for our patients.     Upon your doctor reviewing your chart, it appears that you are due for your colonoscopy or FIT test(colon cancer screen).    We have taken the liberty of ordering these for you. The FIT test is enclosed with a return envelope.      Please call the clinic at [phone] to schedule your colonoscopy appointment or complete the enclosed FIT test and mail in.    If you have already had the above completed, please contact the clinic to have your chart updated.      Thank You,    [facility]

## 2018-05-22 NOTE — TELEPHONE ENCOUNTER
Left message for patient to call back pod 1 hotline([phone])    Patient is due/failing the following:   FIT

## 2018-09-10 ENCOUNTER — PRE VISIT (OUTPATIENT)
Dept: UROLOGY | Facility: CLINIC | Age: 58
End: 2018-09-10

## 2018-09-10 NOTE — TELEPHONE ENCOUNTER
MEDICAL RECORDS REQUEST   [facility]  Urology Clinic  [address] [place] [address]  PHONE: [phone]  Fax: [phone]        FUTURE VISIT INFORMATION                                                   Rex Jensen, : 1960 scheduled for future visit at [facility]    APPOINTMENT INFORMATION:    Date: 10/23/2018    Provider:  Luis Armando barrera    Reason for Visit/Diagnosis: Hydrocele    REFERRAL INFORMATION:    Referring provider:  [facility]    Specialty: Hospital    Referring providers clinic:  [facility] contact number: [phone]     RECORDS REQUESTED FOR VISIT                                                     NOTES  STATUS/DETAILS   OFFICE NOTE from referring provider  in process   OFFICE NOTE from other specialist  no   DISCHARGE SUMMARY from hospital  in process   DISCHARGE REPORT from the ER  in process   OPERATIVE REPORT  in process   MEDICATION LIST  in process       PRE-VISIT CHECKLIST      Record collection complete If no, please explain in process  Request sent to [facility]   Appointment appropriately scheduled           (right time/right provider) Yes   MyChart activation If no, please explain in pocess   Questionnaire complete If no, please explain in process     Completed by: Afia Johnson

## 2018-10-15 ENCOUNTER — NURSE TRIAGE (OUTPATIENT)
Dept: NURSING | Facility: CLINIC | Age: 58
End: 2018-10-15

## 2018-10-15 NOTE — TELEPHONE ENCOUNTER
"\"He has a hydrocele.  Now the size of soft ball.\" Reporting swelling has been increasing over the past 2 months. Stating scrotum \"aches\", \"aching all over.\" Afebrile, denies change in voiding.   Spouse is questioning if he should go to [facility] or [facility] ED?   Patient stepped out of car to go into the store and is not present for further triage. Reviewed with caller if scrotum is painful to be seen in ED now. Reviewed option to call back for any change or increase in symptoms.   Caller verbalized understanding. Denies further questions.    Anail Menjivar, RN  [facility]        Reason for Disposition    Scrotum is painful or tender to touch    Additional Information    Negative: [1] Rash or color change of scrotum BUT [2] no swelling or pain    Negative: Inguinal hernia previously diagnosed by a physician    Negative: Swelling followed a genital injury    Negative: Pain in scrotum is main symptom    Protocols used: SCROTUM SWELLING-ADULT-      "

## 2019-09-28 ENCOUNTER — HEALTH MAINTENANCE LETTER (OUTPATIENT)
Age: 59
End: 2019-09-28

## 2021-01-10 ENCOUNTER — HEALTH MAINTENANCE LETTER (OUTPATIENT)
Age: 61
End: 2021-01-10

## 2021-01-26 ENCOUNTER — PATIENT OUTREACH (OUTPATIENT)
Dept: SURGERY | Facility: CLINIC | Age: 61
End: 2021-01-26

## 2021-01-26 ENCOUNTER — PRE VISIT (OUTPATIENT)
Dept: ONCOLOGY | Facility: CLINIC | Age: 61
End: 2021-01-26

## 2021-01-26 ENCOUNTER — TELEPHONE (OUTPATIENT)
Dept: SURGERY | Facility: CLINIC | Age: 61
End: 2021-01-26

## 2021-01-26 ENCOUNTER — TRANSCRIBE ORDERS (OUTPATIENT)
Dept: OTHER | Age: 61
End: 2021-01-26

## 2021-01-26 DIAGNOSIS — C49.9 LIPOSARCOMA, DIFFERENTIATED (H): Primary | ICD-10-CM

## 2021-01-26 NOTE — TELEPHONE ENCOUNTER
ONCOLOGY INTAKE: Records Information      APPT INFORMATION:  Referring provider:  Dr. Guerra  Referring provider s clinic:  [facility]  Reason for visit/diagnosis:  liposarcoma  Has patient been notified of appointment date and time?: yes    RECORDS INFORMATION:  Were the records received with the referral (via Rightfax)? no    Has patient been seen for any external appt for this diagnosis? no    If yes, where? n/a    Has patient had any imaging or procedures outside of [facility] for this condition? no      If Yes, where? n/a    ADDITIONAL INFORMATION:  none

## 2021-01-26 NOTE — TELEPHONE ENCOUNTER
New Patient Oncology Nurse Navigator Note     Referring provider: Self     Referring Clinic/Organization: Self Referred     Referred to: Surgical Oncology - Soft Tissue      Requested provider (if applicable): Dr. Alvin Bryant    Referral Received: 01/26/21       Evaluation for : Liposarcoma      Clinical History (per Nurse review of records provided):          Past Medical History:   Diagnosis Date     Esophageal reflux      Lyme disease      Memory loss     Trouble with memory loss, ability to think clearly, and  concentration.     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 (Patient not taking: Reported on 9/7/2017) 45 g 0           Allergies   Allergen Reactions     Codeine      Other reaction(s): Intolerance-Can't Take  halluciation     Morphine      Other reaction(s): Intolerance-Can't Take          Clinical Assessment / Barriers to Care (Per Nurse):    None at this time.     Records Location:     [facility] Everywhere     Records Needed:     ABDOMINAL IMAGING (CT SCANS, MRI, US)  DATING BACK TO 2018      Additional testing needed prior to consult:     NONE AT THIS TIME    Referral updates and Plan:       Consult with Surgical Oncology     01/26/2021 4:04 PM - Called and spoke with patient regarding referral and request for second opinion with Dr. Bryant. Informed him we can schedule him for a virtual visit and discussed planned date. Patient was transferred to scheduling to finalize appointment details.     Ashley  Tony, RN, BSN   Surgical Oncology New Patient Nurse Navigator  [facility]  [phone]

## 2021-01-26 NOTE — TELEPHONE ENCOUNTER
The patient called and wants to see Dr. 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 Sent message to NN for review.

## 2021-01-28 ENCOUNTER — VIRTUAL VISIT (OUTPATIENT)
Dept: RADIATION THERAPY | Facility: OUTPATIENT CENTER | Age: 61
End: 2021-01-28
Payer: COMMERCIAL

## 2021-01-28 DIAGNOSIS — C49.9 SARCOMA OF SOFT TISSUE (H): Primary | ICD-10-CM

## 2021-01-28 RX ORDER — MULTIVITAMIN,THERAPEUTIC
1 TABLET ORAL DAILY
COMMUNITY

## 2021-01-28 ASSESSMENT — PATIENT HEALTH QUESTIONNAIRE - PHQ9: SUM OF ALL RESPONSES TO PHQ QUESTIONS 1-9: 0

## 2021-01-28 NOTE — PROGRESS NOTES
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  In 05/2017, I performed a resection of dedifferentiated liposarcoma from his left retroperitoneum.  The tumor was close but not invading the femoral nerve at that time.  He did well from that operation.  He did have positive margins but all gross disease was removed.  He did not have any postoperative adjuvant therapy.  He did well until he recently had a surveillance CT scan this month which demonstrated an obvious recurrence in his left retroperitoneum.  The recurrence measures about 7 cm.  It appears it is invading the left psoas muscle.  It appears to be in encasing the L2 and L3 nerve roots.  It is abutting the spine and it actually extends posteriorly into the erector spinae muscles.  It also appears that the tumor is involving the mesentery of the descending colon.  I do not see any involvement of the ureter or the iliac vessels.  He had an MRI which showed similar findings.  He does not have any obvious metastatic disease in his chest.  Presently, he is asymptomatic from the sarcoma.  I reviewed his CT and MRI findings with him.  I told him what the extent of the surgery would be if we were to try to remove this.  I also told him that he would likely suffer fairly significant disability from excision of his femoral nerve and L2-L3 nerve roots.  He could also have chronic pain after this operation as well.  I also informed him that it would be nearly impossible to get negative microscopic margins on this tumor.  I told him his recurrence rate after surgery would approach 100%.  I told him I did not recommend surgery at this point.  I would recommend either systemic therapy or radiation therapy.  The benefit of radiation therapy does not appear to be substantial in this setting secondary to data from recent randomized clinical trials.  I am going to have  him see Dr. Vinod An as soon as possible to determine whether or not there are any systemic chemotherapy or immune therapy options open for him.  It may be possible that he can have surgery in the future if he has a reduction or long-term stability in the size of his tumor.        I spent 30 minutes talking to Rex and his wife today and another 30 minutes in reviewing and interpreting his CT and MRI and in coordinating his care.      cc:   Vinod An MD     Physicians   [address], [facility] [address]   [place] [address]

## 2021-01-28 NOTE — LETTER
1/28/2021         RE: Rex Jensen  [address] [place] [address]        Dear Colleague,    Thank you for referring your patient, Rex Jensen, to the [facility]. Please see a copy of my visit note below.    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  In 05/2017, I performed a resection of dedifferentiated liposarcoma from his left retroperitoneum.  The tumor was close but not invading the femoral nerve at that time.  He did well from that operation.  He did have positive margins but all gross disease was removed.  He did not have any postoperative adjuvant therapy.  He did well until he recently had a surveillance CT scan this month which demonstrated an obvious recurrence in his left retroperitoneum.  The recurrence measures about 7 cm.  It appears it is invading the left psoas muscle.  It appears to be in encasing the L2 and L3 nerve roots.  It is abutting the spine and it actually extends posteriorly into the erector spinae muscles.  It also appears that the tumor is involving the mesentery of the descending colon.  I do not see any involvement of the ureter or the iliac vessels.  He had an MRI which showed similar findings.  He does not have any obvious metastatic disease in his chest.  Presently, he is asymptomatic from the sarcoma.  I reviewed his CT and MRI findings with him.  I told him what the extent of the surgery would be if we were to try to remove this.  I also told him that he would likely suffer fairly significant disability from excision of his femoral nerve and L2-L3 nerve roots.  He could also have chronic pain after this operation as well.  I also informed him that it would be nearly impossible to get negative microscopic margins on this tumor.  I told him his recurrence rate after surgery would approach 100%.  I told him I did not recommend  "surgery at this point.  I would recommend either systemic therapy or radiation therapy.  The benefit of radiation therapy does not appear to be substantial in this setting secondary to data from recent randomized clinical trials.  I am going to have him see Dr. Vinod An as soon as possible to determine whether or not there are any systemic chemotherapy or immune therapy options open for him.  It may be possible that he can have surgery in the future if he has a reduction or long-term stability in the size of his tumor.        I spent 30 minutes talking to Rex and his wife today and another 30 minutes in reviewing and interpreting his CT and MRI and in coordinating his care.      cc:   Vinod An MD     Physicians   [address] [facility], [facility] [address]   [place] [address]         Oncology Rooming Note    January 28, 2021 11:46 AM   Rex Jensen is a 60 year old male who presents for:    Chief Complaint   Patient presents with     Oncology Clinic Visit     New surgical oncology consult with Dr. Bryant     Initial Vitals: There were no vitals taken for this visit. Estimated body mass index is 26.31 kg/m  as calculated from the following:    Height as of 5/26/17: 1.854 m (6' 0.99\").    Weight as of 7/11/17: 90.4 kg (199 lb 6.4 oz). There is no height or weight on file to calculate BSA.  Data Unavailable Comment: Data Unavailable   No LMP for male patient.  Allergies reviewed: Yes  Medications reviewed: Yes    Medications: Medication refills not needed today.  Pharmacy name entered into Invesdor:    [id number] - [place], [place] - [address] [facility] AT [facility] OF [place] & [address] [place] [address] PHARMACY [facility] [place] [address] [facility]    Clinical concerns: Surgical Oncology Consult- recurrent sarcoma Dr. Bryant was notified.      Brittany Delgadillo RN                  Again, thank you for allowing me to participate in the care of your patient.        Sincerely,        Alvin Bryant MD    "

## 2021-01-28 NOTE — PATIENT INSTRUCTIONS
Preventive Care:    Colorectal Cancer Screening: During our visit today, we discussed that it is recommended you receive colorectal cancer screening. Please call or make an appointment with your primary care provider to discuss this. You may also call the [facility] scheduling line ([phone]) to set up a colonoscopy appointment.

## 2021-01-28 NOTE — PROGRESS NOTES
"Oncology Rooming Note    January 28, 2021 11:46 AM   Rex Jensen is a 60 year old male who presents for:    Chief Complaint   Patient presents with     Oncology Clinic Visit     New surgical oncology consult with Dr. Bryant     Initial Vitals: There were no vitals taken for this visit. Estimated body mass index is 26.31 kg/m  as calculated from the following:    Height as of 5/26/17: 1.854 m (6' 0.99\").    Weight as of 7/11/17: 90.4 kg (199 lb 6.4 oz). There is no height or weight on file to calculate BSA.  Data Unavailable Comment: Data Unavailable   No LMP for male patient.  Allergies reviewed: Yes  Medications reviewed: Yes    Medications: Medication refills not needed today.  Pharmacy name entered into Expreem:    [facility] #[id number] - [place], [place] - [address] [facility] AT SEC OF [address] & [address] [place] [address] PHARMACY [facility] [place], [place] - [address] [facility]    Clinical concerns: Surgical Oncology Consult- recurrent sarcoma Dr. Bryant was notified.      Brittany Delgadillo RN              "

## 2021-02-01 NOTE — TELEPHONE ENCOUNTER
Action    Action Taken 2/1/21: Imaging previously resolved to PACS       RECORDS STATUS - ALL OTHER DIAGNOSIS      RECORDS RECEIVED FROM: [facility]   DATE RECEIVED:    NOTES STATUS DETAILS   [facility] 1/28/[facility]    Also Seen:  Dr. Kelton Guerra: 1/19/21   OFFICE NOTE from medical oncologist [facility] Dr. Josey Monroe: 1/12/21   DISCHARGE SUMMARY from hospital     DISCHARGE REPORT from the ER     OPERATIVE REPORT [facility] 5/9/17: Resection Retroperitoneal Mass  Anesthesia general with Block     MEDICATION LIST [facility]/    CLINICAL TRIAL TREATMENTS TO DATE     LABS     PATHOLOGY REPORTS [facility] 5/9/17, 4/24/7: Surg PAth   ANYTHING RELATED TO DIAGNOSIS     GENONOMIC TESTING     TYPE:     IMAGING (NEED IMAGES & REPORT)     CT SCANS PACS 1/11/21: [facility]   MRI PACS 1/19/21: [place]   MAMMO     ULTRASOUND     PET

## 2021-02-10 ENCOUNTER — PRE VISIT (OUTPATIENT)
Dept: ONCOLOGY | Facility: CLINIC | Age: 61
End: 2021-02-10

## 2021-02-10 ENCOUNTER — VIRTUAL VISIT (OUTPATIENT)
Dept: ONCOLOGY | Facility: CLINIC | Age: 61
End: 2021-02-10
Attending: SURGERY
Payer: COMMERCIAL

## 2021-02-10 DIAGNOSIS — Z90.79 S/P ORCHIECTOMY: ICD-10-CM

## 2021-02-10 DIAGNOSIS — C49.9 SARCOMA OF SOFT TISSUE (H): ICD-10-CM

## 2021-02-10 DIAGNOSIS — Z87.891 SMOKING HISTORY: Primary | ICD-10-CM

## 2021-02-10 PROCEDURE — 99204 OFFICE O/P NEW MOD 45 MIN: CPT | Mod: 95 | Performed by: INTERNAL MEDICINE

## 2021-02-10 PROCEDURE — 999N001193 HC VIDEO/TELEPHONE VISIT; NO CHARGE

## 2021-02-10 NOTE — LETTER
"    2/10/2021         RE: Rex Jensen  [address] [place] [address]        Dear Colleague,    Thank you for referring your patient, Rex Jensen, to the [facility]. Please see a copy of my visit note below.    Rex is a 60 year old who is being evaluated via a billable video visit.      How would you like to obtain your AVS? MyChart  If the video visit is dropped, the invitation should be resent by: Send to e-mail at: [email or web address]  Will anyone else be joining your video visit? No      I have reviewed and updated the patient's allergies and medication list.    Concerns: none  Refills: none     Vitals - Patient Reported  Weight (Patient Reported): 93.9 kg (207 lb)  Height (Patient Reported): 185.4 cm (6' 1\")  BMI (Based on Pt Reported Ht/Wt): 27.31  Pain Score: No Pain (0)    Katiuska Schmitt CMA        Video Start Time: 156  Video-Visit Details    Type of service:  Video Visit    Video End [id number]  Originating Location (pt. Locationhome    Distant Location (provider location):  [facility]        2-10-21      I saw Rex Jensen being referred for a recurrent dedifferentiated liposarcoma.    In brief, he had a dedifferentiated liposarcoma removed in 2017. Over the last few months he been noting some discomfort and imaging revealed recurrence. Dr. Bryant feels it is not resectable noting that involves the left psoas and appears to be encasing the L2 and L3 nerve roots along with extending into the erector spinae muscles. He started a healthier diet and feels he is now asymptomatic from the sarcoma. A 14 point ROS is really otherwise fairly unremarkable today.     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 He also has 3 younger children ages 6/10/14  living at home as well and also 4 other older children.    Past medical history is notable for occasional migraines in " the past which seems to have resolved on diet, history of GERD which resolved by stopping coffee, history of some memory loss in the past (he thinks this was due to stress and no longer notes that), and a possible history of Lyme disease that was treated. He did have a hydrocele removed as well.  Family history is not particular markable.   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 He is a never smoker he stopped smoking age ~23 does not use alcohol or other drugs.    On exam he appeared comfortable with normal affect, full EOMs, no respiratory distress, normal mentation and speech, good mood.      -  I reviewed the pathology of his original resection specimen.    Patient Name: SAGE HOLLOWAY   Specimen #: [id number]   Collected: 5/9/2017     FINAL DIAGNOSIS:   A. Peritoneal biopsy:   - Fibroadipose tissue with chronic inflammation   - No definite evidence of liposarcoma     B. Soft tissue, retroperitoneal tumor, excision:   - Dedifferentiated liposarcoma arising in background of well   differentiated liposarcoma   - - Dedifferentiated component is characterized by undifferentiated   pleomorphic sarcoma and comprises ~50% of the tumor   - FNCLCC sarcoma ndgndrndanddndend:nd nd2nd of 3   - Tumor size: 15.2 cm   - Lymphovascular invasion: Not identified   - Margins: Sarcoma extends to resection margins multifocally     -  I reviewed his imaging demonstrating recurrence.    -  We discussed the nature of his sarcoma and a number of different treatment options. We discussed either Doxil, Doxil plus ifosfamide, or Keytruda as the first approach. We did however also discuss Gemzar, trabectedin, DTIC, and some other drugs. Due to his insurance and living situation he would like to not be in the hospital. They would like to research the options we discussed a bit more before deciding on how they would like to proceed. We did discuss the side effects of Doxil, ifosfamide,  and Keytruda at some length. We will have Argy call and review this again and once we know how they would like proceed we will make a further plan. We did discuss we would like a repeat CT CAP at the time of starting as a no baseline, and depending on change in tumor this will also influence when want to restage. He may benefit from a palliative referral at some point as well.  T>60min including image and chart review    Vinod An M.D.  Professor  Hematology, Oncology and Transplantation

## 2021-02-10 NOTE — PROGRESS NOTES
"Rex is a 60 year old who is being evaluated via a billable video visit.      How would you like to obtain your AVS? MyChart  If the video visit is dropped, the invitation should be resent by: Send to e-mail at: [email or web address]  Will anyone else be joining your video visit? No      I have reviewed and updated the patient's allergies and medication list.    Concerns: none  Refills: none     Vitals - Patient Reported  Weight (Patient Reported): 93.9 kg (207 lb)  Height (Patient Reported): 185.4 cm (6' 1\")  BMI (Based on Pt Reported Ht/Wt): 27.31  Pain Score: No Pain (0)    Katiuska Schmitt CMA        Video Start Time: 156  Video-Visit Details    Type of service:  Video Visit    Video End [id number]  Originating Location (pt. Locationhome    Distant Location (provider location):  [facility]        2-10-21      I saw Rex Jensen being referred for a recurrent dedifferentiated liposarcoma.    In brief, he had a dedifferentiated liposarcoma removed in 2017. Over the last few months he been noting some discomfort and imaging revealed recurrence. Dr. Bryant feels it is not resectable noting that involves the left psoas and appears to be encasing the L2 and L3 nerve roots along with extending into the erector spinae muscles. He started a healthier diet and feels he is now asymptomatic from the sarcoma. A 14 point ROS is really otherwise fairly unremarkable today.     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 He also has 3 younger children ages 6/10/14  living at home as well and also 4 other older children.    Past medical history is notable for occasional migraines in the past which seems to have resolved on diet, history of GERD which resolved by stopping coffee, history of some memory loss in the past (he thinks this was due to stress and no longer notes that), and a possible history of Lyme disease that was treated. He " did have a hydrocele removed as well.  Family history is not particular markable.   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 He is a never smoker he stopped smoking age ~23 does not use alcohol or other drugs.    On exam he appeared comfortable with normal affect, full EOMs, no respiratory distress, normal mentation and speech, good mood.      -  I reviewed the pathology of his original resection specimen.    Patient Name: SAGE HOLLOWAY   Specimen #: [id number]   Collected: 5/9/2017     FINAL DIAGNOSIS:   A. Peritoneal biopsy:   - Fibroadipose tissue with chronic inflammation   - No definite evidence of liposarcoma     B. Soft tissue, retroperitoneal tumor, excision:   - Dedifferentiated liposarcoma arising in background of well   differentiated liposarcoma   - - Dedifferentiated component is characterized by undifferentiated   pleomorphic sarcoma and comprises ~50% of the tumor   - FNCLCC sarcoma ndgndrndanddndend:nd nd2nd of 3   - Tumor size: 15.2 cm   - Lymphovascular invasion: Not identified   - Margins: Sarcoma extends to resection margins multifocally     -  I reviewed his imaging demonstrating recurrence.    -  We discussed the nature of his sarcoma and a number of different treatment options. We discussed either Doxil, Doxil plus ifosfamide, or Keytruda as the first approach. We did however also discuss Gemzar, trabectedin, DTIC, and some other drugs. Due to his insurance and living situation he would like to not be in the hospital. They would like to research the options we discussed a bit more before deciding on how they would like to proceed. We did discuss the side effects of Doxil, ifosfamide, and Keytruda at some length. We will have Argy call and review this again and once we know how they would like proceed we will make a further plan. We did discuss we would like a repeat CT CAP at the time of starting as a no baseline, and depending on change  in tumor this will also influence when want to restage. He may benefit from a palliative referral at some point as well.  T>60min including image and chart review    Vinod An M.D.  Professor  Hematology, Oncology and Transplantation

## 2021-02-17 ENCOUNTER — PATIENT OUTREACH (OUTPATIENT)
Dept: ONCOLOGY | Facility: CLINIC | Age: 61
End: 2021-02-17
Payer: COMMERCIAL

## 2021-10-23 ENCOUNTER — HEALTH MAINTENANCE LETTER (OUTPATIENT)
Age: 61
End: 2021-10-23

## 2022-02-12 ENCOUNTER — HEALTH MAINTENANCE LETTER (OUTPATIENT)
Age: 62
End: 2022-02-12

## 2022-10-09 ENCOUNTER — HEALTH MAINTENANCE LETTER (OUTPATIENT)
Age: 62
End: 2022-10-09

## 2023-02-18 ENCOUNTER — HEALTH MAINTENANCE LETTER (OUTPATIENT)
Age: 63
End: 2023-02-18

## 2024-03-16 ENCOUNTER — HEALTH MAINTENANCE LETTER (OUTPATIENT)
Age: 64
End: 2024-03-16

## 2025-08-16 ENCOUNTER — HEALTH MAINTENANCE LETTER (OUTPATIENT)
Age: 65
End: 2025-08-16

## (undated) DEVICE — CLIP HORIZON LG ORANGE 004200

## (undated) DEVICE — SU SILK 3-0 SH CR 8X18" C013D

## (undated) DEVICE — BLADE CLIPPER SGL USE 9680

## (undated) DEVICE — SU VICRYL 3-0 SH 27" J316H

## (undated) DEVICE — SOL WATER IRRIG 1000ML BOTTLE 2F7114

## (undated) DEVICE — SUCTION TIP POOLE K770

## (undated) DEVICE — CLIP HORIZON SM RED WIDE SLOT 001201

## (undated) DEVICE — LINEN TOWEL PACK X30 5481

## (undated) DEVICE — Device

## (undated) DEVICE — SU SILK 2-0 TIE 12X30" A305H

## (undated) DEVICE — STPL SKIN 35W 059037

## (undated) DEVICE — LINEN TOWEL PACK X6 WHITE 5487

## (undated) DEVICE — SU PDS II 4-0 FS-2 27" Z422H

## (undated) DEVICE — SOL NACL 0.9% IRRIG 1000ML BOTTLE 2F7124

## (undated) DEVICE — SUCTION MANIFOLD DORNOCH ULTRA CART UL-CL500

## (undated) DEVICE — DRAPE IOBAN INCISE 23X17" 6650EZ

## (undated) DEVICE — SU SILK 3-0 TIE 12X30" A304H

## (undated) DEVICE — SPONGE LAP 18X18" X8435

## (undated) DEVICE — SPONGE KITTNER 30-101

## (undated) DEVICE — ESU GROUND PAD ADULT W/CORD E7507

## (undated) DEVICE — ESU HARMONIC ACE LAPAROSCOPIC SHEARS 5MMX23CM HAR23

## (undated) DEVICE — SU DERMABOND ADVANCED .7ML DNX12

## (undated) DEVICE — SU SILK 0 TIE 6X30" A306H

## (undated) DEVICE — PREP CHLORAPREP 26ML TINTED ORANGE  260815

## (undated) DEVICE — VESSEL LOOPS YELLOW MAXI 31145694

## (undated) DEVICE — CLIP HORIZON MED BLUE 002200

## (undated) DEVICE — SU SILK 4-0 TIE 12X30" A303H

## (undated) DEVICE — SU PDS II 2-0 CT-1 27" Z339H

## (undated) DEVICE — CATH TRAY FOLEY SURESTEP 16FR W/URNE MTR STLK LATEX A303316A

## (undated) RX ORDER — CEFAZOLIN SODIUM 2 G/100ML
INJECTION, SOLUTION INTRAVENOUS
Status: DISPENSED
Start: 2017-05-09

## (undated) RX ORDER — FENTANYL CITRATE 50 UG/ML
INJECTION, SOLUTION INTRAMUSCULAR; INTRAVENOUS
Status: DISPENSED
Start: 2017-05-09

## (undated) RX ORDER — HYDROMORPHONE HYDROCHLORIDE 1 MG/ML
INJECTION, SOLUTION INTRAMUSCULAR; INTRAVENOUS; SUBCUTANEOUS
Status: DISPENSED
Start: 2017-05-09

## (undated) RX ORDER — SODIUM CHLORIDE, SODIUM LACTATE, POTASSIUM CHLORIDE, CALCIUM CHLORIDE 600; 310; 30; 20 MG/100ML; MG/100ML; MG/100ML; MG/100ML
INJECTION, SOLUTION INTRAVENOUS
Status: DISPENSED
Start: 2017-05-09